# Patient Record
Sex: FEMALE | Race: BLACK OR AFRICAN AMERICAN | NOT HISPANIC OR LATINO | Employment: OTHER | URBAN - METROPOLITAN AREA
[De-identification: names, ages, dates, MRNs, and addresses within clinical notes are randomized per-mention and may not be internally consistent; named-entity substitution may affect disease eponyms.]

---

## 2017-12-06 ENCOUNTER — TELEPHONE (OUTPATIENT)
Dept: FAMILY MEDICINE | Facility: CLINIC | Age: 62
End: 2017-12-06

## 2017-12-06 NOTE — TELEPHONE ENCOUNTER
----- Message from Marilee Harry sent at 12/6/2017  3:02 PM CST -----  Contact: Dulce -Nurse at [facility]  She states that Dr Lu would like for Dr Casas to call him before you leave the office today.  Call him on his cell at [phone].   Patient is a in patient at the hospital.                                           rodriguez

## 2017-12-13 ENCOUNTER — OFFICE VISIT (OUTPATIENT)
Dept: FAMILY MEDICINE | Facility: CLINIC | Age: 62
End: 2017-12-13
Payer: COMMERCIAL

## 2017-12-13 VITALS
BODY MASS INDEX: 55.32 KG/M2 | DIASTOLIC BLOOD PRESSURE: 76 MMHG | TEMPERATURE: 98 F | HEIGHT: 61 IN | SYSTOLIC BLOOD PRESSURE: 122 MMHG | HEART RATE: 103 BPM | RESPIRATION RATE: 18 BRPM | OXYGEN SATURATION: 96 % | WEIGHT: 293 LBS

## 2017-12-13 DIAGNOSIS — I10 ESSENTIAL HYPERTENSION: ICD-10-CM

## 2017-12-13 DIAGNOSIS — I26.99 BILATERAL PULMONARY EMBOLISM: ICD-10-CM

## 2017-12-13 DIAGNOSIS — L03.116 CELLULITIS OF LEFT LOWER EXTREMITY: ICD-10-CM

## 2017-12-13 DIAGNOSIS — Z09 HOSPITAL DISCHARGE FOLLOW-UP: Primary | ICD-10-CM

## 2017-12-13 PROCEDURE — 99214 OFFICE O/P EST MOD 30 MIN: CPT | Mod: 25,S$GLB,, | Performed by: REGISTERED NURSE

## 2017-12-13 PROCEDURE — 99999 PR PBB SHADOW E&M-EST. PATIENT-LVL IV: CPT | Mod: PBBFAC,,, | Performed by: REGISTERED NURSE

## 2017-12-13 PROCEDURE — 96372 THER/PROPH/DIAG INJ SC/IM: CPT | Mod: S$GLB,,, | Performed by: FAMILY MEDICINE

## 2017-12-13 RX ORDER — MULTIVIT WITH MINERALS/HERBS
1 TABLET ORAL DAILY
COMMUNITY

## 2017-12-13 RX ORDER — APIXABAN 5 MG/1
TABLET, FILM COATED ORAL
Refills: 0 | COMMUNITY
Start: 2017-12-06 | End: 2018-01-11 | Stop reason: SDUPTHER

## 2017-12-13 RX ORDER — METOPROLOL TARTRATE 25 MG/1
25 TABLET, FILM COATED ORAL 2 TIMES DAILY
Refills: 3 | COMMUNITY
Start: 2017-11-07

## 2017-12-13 RX ORDER — FUROSEMIDE 40 MG/1
40 TABLET ORAL 2 TIMES DAILY
Refills: 11 | COMMUNITY
Start: 2017-11-07 | End: 2023-04-26

## 2017-12-13 RX ORDER — POTASSIUM CHLORIDE 20 MEQ/1
20 TABLET, EXTENDED RELEASE ORAL DAILY
Refills: 11 | COMMUNITY
Start: 2017-12-06 | End: 2019-07-09 | Stop reason: SDUPTHER

## 2017-12-13 RX ORDER — CEFTRIAXONE 1 G/1
1 INJECTION, POWDER, FOR SOLUTION INTRAMUSCULAR; INTRAVENOUS
Status: COMPLETED | OUTPATIENT
Start: 2017-12-13 | End: 2017-12-13

## 2017-12-13 RX ADMIN — CEFTRIAXONE 1 G: 1 INJECTION, POWDER, FOR SOLUTION INTRAMUSCULAR; INTRAVENOUS at 11:12

## 2017-12-13 NOTE — PROGRESS NOTES
"Subjective:       Patient ID: April Castillo is a 62 y.o. female.    Chief Complaint: Hospital Follow Up      HPI    Mrs. Castillo is here today for hospital follow-up appointment.  She reports getting up to the restroom at home on 12/2/2017 and developed acute SOB and weakness.  Took an ASA at that time.  Subsequently, developed chest pain and flushing.  Called 911 and transported to [facility] for evaluation.  CT of chest showed bilateral PE.  Admitted to hospital for treatment, given Lovenox during stay.  Also treated with antibiotics for LLE cellulitis.  Discharged home on 12/6/2017.  Reports doing much better.  No further episodes of weakness, SOB or chest pain.  She is currently on Eliquis, has completed antibiotics.  Reports LLE leg pain and redness but only slightly improved.  Does have a walker and shower chair for use at home.  Will be f/u with Dr. Luu (Cardiology) on 12/18/2017.       Review of Systems   Constitutional: Negative for chills and fever.   Respiratory: Positive for shortness of breath (resolved). Negative for cough, choking, chest tightness, wheezing and stridor.    Cardiovascular: Positive for chest pain (resolved) and leg swelling (cellulitis). Negative for palpitations.   Neurological: Negative.          Patient Active Problem List   Diagnosis    Spinal stenosis    Gout with manifestations    Chronic pain         Objective:     Vitals:    12/13/17 1030   BP: 122/76   Pulse: 103   Resp: 18   Temp: 97.9 °F (36.6 °C)   TempSrc: Tympanic   SpO2: 96%   Weight: (!) 148 kg (326 lb 4.5 oz)   Height: 5' 1" (1.549 m)   PainSc:   7   PainLoc: Leg       Physical Exam   Constitutional: She is oriented to person, place, and time. She appears well-developed and well-nourished.   Cardiovascular: Normal rate, regular rhythm, normal heart sounds, intact distal pulses and normal pulses.    Pulmonary/Chest: Effort normal and breath sounds normal. No respiratory distress. She has no wheezes. She has no rales. " She exhibits no tenderness.   Neurological: She is alert and oriented to person, place, and time.   Skin: Capillary refill takes less than 2 seconds. Rash (LLE cellulitis) noted.        Psychiatric: She has a normal mood and affect. Her behavior is normal. Judgment and thought content normal.   Vitals reviewed.        Medication List with Changes/Refills   Current Medications    B COMPLEX VITAMINS TABLET    Take 1 tablet by mouth once daily.    CHOLECALCIFEROL, VITAMIN D3, (VITAMIN D3) 5,000 UNIT TAB    Take 5,000 Units by mouth once daily.    DULOXETINE (CYMBALTA) 30 MG CAPSULE    Take by mouth. 1 Capsule, Delayed Release(E.C.) Oral Every day    ELIQUIS 5 MG TAB    TAKE 2 TABLETS BY MOUTH TWICE DAILY UNTIL 12/11 THEN TAKE 1 TABLET TWICE DAILY FOR 6 MONTHS    FOLIC ACID/MULTIVIT-MIN/LUTEIN (CENTRUM SILVER ORAL)    Take by mouth.    FUROSEMIDE (LASIX) 40 MG TABLET    Take 40 mg by mouth 2 (two) times daily.    HYDROCHLOROTHIAZIDE (HYDRODIURIL) 12.5 MG TAB    Take 1 tablet (12.5 mg total) by mouth daily as needed.    HYDROCODONE-ACETAMINOPHEN 10-325MG (NORCO)  MG TAB    Take by mouth. 1 Tablet Oral Four times a day    MELOXICAM (MOBIC) 15 MG TABLET    Take by mouth. 1 Tablet Oral Every day    METOPROLOL TARTRATE (LOPRESSOR) 25 MG TABLET    Take 25 mg by mouth 2 (two) times daily.    OXYMORPHONE (OPANA ER) 20 MG 12 HR TABLET    Take 10 mg by mouth. 1 tablet extended release 12 hr Oral Three times a day    POTASSIUM CHLORIDE SA (K-DUR,KLOR-CON) 20 MEQ TABLET    Take 20 mEq by mouth once daily.    TRAMADOL (ULTRAM) 50 MG TABLET    Take 50 mg by mouth 4 (four) times daily.    TURMERIC-TURMERIC ROOT EXTRACT ORAL    Take by mouth.    ZONISAMIDE (ZONEGRAN) 100 MG CAP    Take by mouth. 4 Capsule Oral Every evening           Diagnosis       1. Hospital discharge follow-up    2. Bilateral pulmonary embolism    3. Cellulitis of left lower extremity    4. Essential hypertension          Assessment/ Plan     Hospital  discharge follow-up        -      Records reviewed.    Bilateral pulmonary embolism        -      Stable, on Eliquis.    Cellulitis of left lower extremity  -     cefTRIAXone injection 1 g; Inject 1 g into the muscle one time.  -     Elevate, local warm compresses.    Essential hypertension        -     Controlled, on medication as ordered.        Hospital records reviewed provided by patient.  Continue home meds as ordered.  Keep appointment with Dr. Luu as scheduled on 12/18/2017.  To see me for recheck of leg in 2 days.  RTC prn.        ZAC Khan  [facility]

## 2017-12-14 ENCOUNTER — PATIENT OUTREACH (OUTPATIENT)
Dept: ADMINISTRATIVE | Facility: HOSPITAL | Age: 62
End: 2017-12-14

## 2018-01-11 RX ORDER — APIXABAN 5 MG/1
TABLET, FILM COATED ORAL
Qty: 30 TABLET | Refills: 0 | Status: SHIPPED | OUTPATIENT
Start: 2018-01-11 | End: 2018-02-08 | Stop reason: SDUPTHER

## 2018-01-11 NOTE — TELEPHONE ENCOUNTER
----- Message from Lilly Camacho sent at 1/11/2018  1:49 PM CST -----  Contact: Pt  Pt called and stated she needed to speak to the nurse. She stated she needs a refill on her medication today.      1. What is the name of the medication you are requesting? Eloquis   2. What is the dose? 5 mg  3. How do you take the medication? Orally, topically, etc? Orally   4. How often do you take this medication? Once a day   5. Do you need a 30 day or 90 day supply? 30 day   6. How many refills are you requesting?   7. What is your preferred pharmacy and location of the pharmacy?   [facility]/pharmacy #[id number] - [place] - [address] AT AT [facility]  [address]  [place] [address]  Phone: [phone] Fax: [phone]      8. Who can we contact with further questions? She can be reached at [phone].  Thanks,  TF

## 2018-02-08 RX ORDER — APIXABAN 5 MG/1
TABLET, FILM COATED ORAL
Qty: 60 TABLET | Refills: 0 | Status: SHIPPED | OUTPATIENT
Start: 2018-02-08 | End: 2018-03-13 | Stop reason: SDUPTHER

## 2018-02-08 NOTE — TELEPHONE ENCOUNTER
----- Message from Robbie Wells sent at 2/8/2018  2:13 PM CST -----  Contact: Pt  Please give pt a call at [phone] regarding a error in her medication

## 2018-03-09 NOTE — TELEPHONE ENCOUNTER
----- Message from Michelle Braswell sent at 3/9/2018 11:48 AM CST -----  Contact: self [phone]  States that she is calling to check status on refill request for eliquis. Please call back at [phone]//thank you acc

## 2018-03-12 RX ORDER — APIXABAN 5 MG/1
TABLET, FILM COATED ORAL
Qty: 60 TABLET | Refills: 0 | Status: CANCELLED | OUTPATIENT
Start: 2018-03-12

## 2018-03-12 NOTE — TELEPHONE ENCOUNTER
Please advise pt as she follows with Dr. Luu (Cardiology) for PE/blood clot, I will not be able to honor refill for her; she needs to get if from him.  How long has she said she needs to be on it?

## 2018-03-12 NOTE — TELEPHONE ENCOUNTER
Pt states sakshi has been filling. She is going to take her last pill tonight and will need one in the morning. Wants to know if you can fill in her place.

## 2018-03-12 NOTE — TELEPHONE ENCOUNTER
----- Message from Vonnie Mosley sent at 3/12/2018  3:05 PM CDT -----  Contact: pt  The pt request a call concerning a med refill, the pt can be reached at [phone]///thxMW

## 2018-03-13 RX ORDER — APIXABAN 5 MG/1
TABLET, FILM COATED ORAL
Qty: 60 TABLET | Refills: 0 | Status: SHIPPED | OUTPATIENT
Start: 2018-03-13

## 2018-03-13 NOTE — TELEPHONE ENCOUNTER
This is her last refill from me --- needs to be discussing and getting from her Cardiologist///adc

## 2018-03-13 NOTE — TELEPHONE ENCOUNTER
----- Message from Abhinav Ma sent at 3/13/2018  8:41 AM CDT -----  Contact: pt  1. What is the name of the medication you are requesting? Elquis  2. What is the dose? 5 mg  3. How do you take the medication? Orally, topically, etc? Orally  4. How often do you take this medication? Twice daily  5. Do you need a 30 day or 90 day supply? 90  6. How many refills are you requesting? 4  7. What is your preferred pharmacy and location of the pharmacy? [facility] on [address] ph# [phone]  8. Who can we contact with further questions? [phone] (cell)    Pt states she is out of her medication and pls, give her a call when Rx has been sent to the pharmacy....

## 2018-03-13 NOTE — TELEPHONE ENCOUNTER
Pt notified of medication sent to the pharmacy and to get further refills from cardiologist. Pt voiced understanding.

## 2018-06-15 DIAGNOSIS — Z12.39 BREAST CANCER SCREENING: ICD-10-CM

## 2018-08-24 ENCOUNTER — OFFICE VISIT (OUTPATIENT)
Dept: FAMILY MEDICINE | Facility: CLINIC | Age: 63
End: 2018-08-24
Payer: COMMERCIAL

## 2018-08-24 VITALS
WEIGHT: 293 LBS | HEART RATE: 76 BPM | BODY MASS INDEX: 55.32 KG/M2 | SYSTOLIC BLOOD PRESSURE: 134 MMHG | OXYGEN SATURATION: 95 % | TEMPERATURE: 98 F | HEIGHT: 61 IN | DIASTOLIC BLOOD PRESSURE: 88 MMHG

## 2018-08-24 DIAGNOSIS — R60.0 BILATERAL LOWER EXTREMITY EDEMA: ICD-10-CM

## 2018-08-24 DIAGNOSIS — L03.90 CELLULITIS, UNSPECIFIED CELLULITIS SITE: Primary | ICD-10-CM

## 2018-08-24 DIAGNOSIS — G89.4 CHRONIC PAIN SYNDROME: ICD-10-CM

## 2018-08-24 PROCEDURE — 3008F BODY MASS INDEX DOCD: CPT | Mod: CPTII,S$GLB,, | Performed by: REGISTERED NURSE

## 2018-08-24 PROCEDURE — 99214 OFFICE O/P EST MOD 30 MIN: CPT | Mod: S$GLB,,, | Performed by: REGISTERED NURSE

## 2018-08-24 PROCEDURE — 99999 PR PBB SHADOW E&M-EST. PATIENT-LVL IV: CPT | Mod: PBBFAC,,, | Performed by: REGISTERED NURSE

## 2018-08-24 PROCEDURE — 3079F DIAST BP 80-89 MM HG: CPT | Mod: CPTII,S$GLB,, | Performed by: REGISTERED NURSE

## 2018-08-24 PROCEDURE — 3075F SYST BP GE 130 - 139MM HG: CPT | Mod: CPTII,S$GLB,, | Performed by: REGISTERED NURSE

## 2018-08-24 RX ORDER — CLINDAMYCIN HYDROCHLORIDE 300 MG/1
300 CAPSULE ORAL 3 TIMES DAILY
Qty: 30 CAPSULE | Refills: 0 | Status: SHIPPED | OUTPATIENT
Start: 2018-08-24 | End: 2018-09-03

## 2018-08-30 NOTE — PROGRESS NOTES
"Subjective:       Patient ID: April Castillo is a 63 y.o. female.    Chief Complaint: Leg Pain       HPI    Mrs. Castillo is here today with c/o LT lower leg pain x 1 week.  Denies injury or trauma.  Does have a Morphine pain pump inserted, followed by Dr. Rader.  She did notify Dr. Rader and Jus (her pain MD) about the leg pain, US of leg done and was negative for clots.  Vitamin levels were checked, okay.  Feels the pain "deep to my bone".  Uses walker to get around.  Currently on Norco 10 mg as ordered per Dr. Luu.      Review of Systems   Constitutional: Negative.    Respiratory: Negative.    Cardiovascular: Positive for leg swelling. Negative for chest pain and palpitations.   Musculoskeletal: Positive for gait problem, joint swelling and myalgias (LT lower leg pain).   Skin: Positive for color change (lower legs discolored).   Neurological: Negative for weakness, light-headedness, numbness and headaches.         Patient Active Problem List   Diagnosis    Spinal stenosis    Gout with manifestations    Chronic pain    Bilateral pulmonary embolism    Essential hypertension       Past medical, surgical, family and social histories have been reviewed today.        Objective:     Vitals:    08/24/18 1144   BP: 134/88   Pulse: 76   Temp: 97.5 °F (36.4 °C)   TempSrc: Oral   SpO2: 95%   Weight: (!) 147.8 kg (325 lb 13.4 oz)   Height: 5' 1" (1.549 m)   PainSc: 10-Worst pain ever   PainLoc: Leg       Physical Exam   Constitutional: She is oriented to person, place, and time. She appears well-developed and well-nourished.   Cardiovascular: Normal rate and regular rhythm.   Pulmonary/Chest: Effort normal and breath sounds normal.   Musculoskeletal: Normal range of motion. She exhibits edema (1+ pitting BLE with early signs of cellulitis to LLE from ankle to tib-fib area) and tenderness (lower leg). She exhibits no deformity.   Neurological: She is alert and oriented to person, place, and time.   Psychiatric: She has " a normal mood and affect. Her behavior is normal. Judgment and thought content normal.   Vitals reviewed.        Medication List with Changes/Refills   New Medications    CLINDAMYCIN (CLEOCIN) 300 MG CAPSULE    Take 1 capsule (300 mg total) by mouth 3 (three) times daily. for 10 days   Current Medications    B COMPLEX VITAMINS TABLET    Take 1 tablet by mouth once daily.    CHOLECALCIFEROL, VITAMIN D3, (VITAMIN D3) 5,000 UNIT TAB    Take 5,000 Units by mouth once daily.    DULOXETINE (CYMBALTA) 30 MG CAPSULE    Take by mouth. 1 Capsule, Delayed Release(E.C.) Oral Every day    ELIQUIS 5 MG TAB    TAKE 2 TABLETS BY MOUTH TWICE DAILY UNTIL 12/11 THEN TAKE 1 TABLET TWICE DAILY FOR 6 MONTHS    FOLIC ACID/MULTIVIT-MIN/LUTEIN (CENTRUM SILVER ORAL)    Take by mouth.    FUROSEMIDE (LASIX) 40 MG TABLET    Take 40 mg by mouth 2 (two) times daily.    HYDROCODONE-ACETAMINOPHEN 10-325MG (NORCO)  MG TAB    Take by mouth. 1 Tablet Oral Four times a day    MELOXICAM (MOBIC) 15 MG TABLET    Take by mouth. 1 Tablet Oral Every day    METOPROLOL TARTRATE (LOPRESSOR) 25 MG TABLET    Take 25 mg by mouth 2 (two) times daily.    POTASSIUM CHLORIDE SA (K-DUR,KLOR-CON) 20 MEQ TABLET    Take 20 mEq by mouth once daily.    TRAMADOL (ULTRAM) 50 MG TABLET    Take 50 mg by mouth 4 (four) times daily.    TURMERIC-TURMERIC ROOT EXTRACT ORAL    Take by mouth.    ZONISAMIDE (ZONEGRAN) 100 MG CAP    Take by mouth. 4 Capsule Oral Every evening   Discontinued Medications    HYDROCHLOROTHIAZIDE (HYDRODIURIL) 12.5 MG TAB    Take 1 tablet (12.5 mg total) by mouth daily as needed.    OXYMORPHONE (OPANA ER) 20 MG 12 HR TABLET    Take 10 mg by mouth. 1 tablet extended release 12 hr Oral Three times a day           Diagnosis       1. Cellulitis, unspecified cellulitis site    2. Bilateral lower extremity edema    3. Chronic pain syndrome          Assessment/ Plan     Cellulitis, unspecified cellulitis site  · Problem not controlled at this  time.  · Medication discussed, take as directed.  · Leg care discussed.  · Orders:  -     clindamycin (CLEOCIN) 300 MG capsule; Take 1 capsule (300 mg total) by mouth 3 (three) times daily. for 10 days  Dispense: 30 capsule; Refill: 0    Bilateral lower extremity edema  · Low-salt diet, elevate legs.  · Continue Lasix as ordered.    Chronic pain syndrome  · Followed by Celeste Luu and Prasanth, pain pump in place.        Follow-up in clinic in 2 to 3 days if condition worsens or fails to improve.  RTC prn.        ZAC Khan  [facility]

## 2018-09-04 ENCOUNTER — TELEPHONE (OUTPATIENT)
Dept: FAMILY MEDICINE | Facility: CLINIC | Age: 63
End: 2018-09-04

## 2018-09-04 NOTE — TELEPHONE ENCOUNTER
Pt states that the antibiotics worked and that her legs are still hurting but not as bad. She would like more antibiotics called in.

## 2018-09-04 NOTE — TELEPHONE ENCOUNTER
----- Message from Danya Sharp sent at 9/4/2018 11:24 AM CDT -----  Contact: Patient  Patient called to speak with the nurse; she stated she was put on antibiotics last week and she wanted to give an update on her condition. She can be contacted at [phone].    Thanks,  Danya

## 2018-11-06 ENCOUNTER — OFFICE VISIT (OUTPATIENT)
Dept: FAMILY MEDICINE | Facility: CLINIC | Age: 63
End: 2018-11-06
Payer: COMMERCIAL

## 2018-11-06 ENCOUNTER — TELEPHONE (OUTPATIENT)
Dept: FAMILY MEDICINE | Facility: CLINIC | Age: 63
End: 2018-11-06

## 2018-11-06 VITALS
SYSTOLIC BLOOD PRESSURE: 110 MMHG | HEIGHT: 61 IN | HEART RATE: 86 BPM | DIASTOLIC BLOOD PRESSURE: 64 MMHG | WEIGHT: 293 LBS | RESPIRATION RATE: 22 BRPM | OXYGEN SATURATION: 96 % | BODY MASS INDEX: 55.32 KG/M2

## 2018-11-06 DIAGNOSIS — R32 URINARY INCONTINENCE, UNSPECIFIED TYPE: ICD-10-CM

## 2018-11-06 DIAGNOSIS — I10 ESSENTIAL HYPERTENSION: ICD-10-CM

## 2018-11-06 DIAGNOSIS — L03.115 CELLULITIS OF RIGHT LOWER LEG: Primary | ICD-10-CM

## 2018-11-06 LAB
BILIRUB UR QL STRIP: NEGATIVE
CLARITY UR REFRACT.AUTO: CLEAR
COLOR UR AUTO: YELLOW
GLUCOSE UR QL STRIP: NEGATIVE
HGB UR QL STRIP: ABNORMAL
KETONES UR QL STRIP: NEGATIVE
LEUKOCYTE ESTERASE UR QL STRIP: NEGATIVE
MICROSCOPIC COMMENT: ABNORMAL
NITRITE UR QL STRIP: NEGATIVE
PH UR STRIP: 6 [PH] (ref 5–8)
PROT UR QL STRIP: NEGATIVE
RBC #/AREA URNS AUTO: 43 /HPF (ref 0–4)
SP GR UR STRIP: 1.01 (ref 1–1.03)
SQUAMOUS #/AREA URNS AUTO: 1 /HPF
URN SPEC COLLECT METH UR: ABNORMAL
WBC #/AREA URNS AUTO: 2 /HPF (ref 0–5)

## 2018-11-06 PROCEDURE — 81001 URINALYSIS AUTO W/SCOPE: CPT

## 2018-11-06 PROCEDURE — 99214 OFFICE O/P EST MOD 30 MIN: CPT | Mod: S$GLB,,, | Performed by: REGISTERED NURSE

## 2018-11-06 PROCEDURE — 87086 URINE CULTURE/COLONY COUNT: CPT

## 2018-11-06 PROCEDURE — 3078F DIAST BP <80 MM HG: CPT | Mod: CPTII,S$GLB,, | Performed by: REGISTERED NURSE

## 2018-11-06 PROCEDURE — 3074F SYST BP LT 130 MM HG: CPT | Mod: CPTII,S$GLB,, | Performed by: REGISTERED NURSE

## 2018-11-06 PROCEDURE — 99999 PR PBB SHADOW E&M-EST. PATIENT-LVL V: CPT | Mod: PBBFAC,,, | Performed by: REGISTERED NURSE

## 2018-11-06 PROCEDURE — 3008F BODY MASS INDEX DOCD: CPT | Mod: CPTII,S$GLB,, | Performed by: REGISTERED NURSE

## 2018-11-06 RX ORDER — DULOXETIN HYDROCHLORIDE 60 MG/1
60 CAPSULE, DELAYED RELEASE ORAL DAILY
Refills: 11 | COMMUNITY
Start: 2018-10-30 | End: 2022-03-03 | Stop reason: SDUPTHER

## 2018-11-06 RX ORDER — SULFAMETHOXAZOLE AND TRIMETHOPRIM 800; 160 MG/1; MG/1
1 TABLET ORAL 2 TIMES DAILY
Qty: 20 TABLET | Refills: 0 | Status: SHIPPED | OUTPATIENT
Start: 2018-11-06 | End: 2018-11-16

## 2018-11-06 NOTE — TELEPHONE ENCOUNTER
----- Message from Yokasta Marrero sent at 11/6/2018  3:49 PM CST -----  Contact: self  Requesting a call back regarding not receiving after visit summary.also she would like to know is there any type of creme she will need to put on her legs.you can mail paperwork.please call back at [phone].      Yokasta Marrero

## 2018-11-06 NOTE — PROGRESS NOTES
"Subjective:       Patient ID: April Castillo is a 63 y.o. female.    Chief Complaint: Leg Swelling      HPI    April Castillo is here today with c/o leg swelling since Sunday 11/4/2018.  Since that time, she has had significant redness and swelling to the right lower leg with tenderness & pain.  Denies drainage from skin.  Has not been elevating leg to help with swelling.  Reports "wetting myself" and leaking, not able to make it to the toilet in time.  Denies fever, chills, painful urination, or hematuria.  Followed for chronic back issues with Celeste Ortiz and Prasanth.      Review of Systems   Constitutional: Negative for chills and fever.   Respiratory: Negative.    Cardiovascular: Positive for leg swelling. Negative for chest pain and palpitations.   Genitourinary: Positive for enuresis and frequency. Negative for flank pain, hematuria, pelvic pain and urgency.   Musculoskeletal: Positive for back pain.   Skin: Positive for color change.   Neurological: Negative.        Review of patient's allergies indicates:  No Active Allergies    Patient Active Problem List   Diagnosis    Spinal stenosis    Gout with manifestations    Chronic pain    Bilateral pulmonary embolism    Essential hypertension       Current Outpatient Medications on File Prior to Visit   Medication Sig Dispense Refill    b complex vitamins tablet Take 1 tablet by mouth once daily.      cholecalciferol, vitamin D3, (VITAMIN D3) 5,000 unit Tab Take 5,000 Units by mouth once daily.      duloxetine (CYMBALTA) 30 MG capsule Take by mouth. 1 Capsule, Delayed Release(E.C.) Oral Every day      DULoxetine (CYMBALTA) 60 MG capsule Take 60 mg by mouth once daily.  11    ELIQUIS 5 mg Tab TAKE 2 TABLETS BY MOUTH TWICE DAILY UNTIL 12/11 THEN TAKE 1 TABLET TWICE DAILY FOR 6 MONTHS 60 tablet 0    FOLIC ACID/MULTIVIT-MIN/LUTEIN (CENTRUM SILVER ORAL) Take by mouth.      furosemide (LASIX) 40 MG tablet Take 40 mg by mouth 2 (two) times daily.  11    " "hydrocodone-acetaminophen 10-325mg (NORCO)  mg Tab Take by mouth. 1 Tablet Oral Four times a day      metoprolol tartrate (LOPRESSOR) 25 MG tablet Take 25 mg by mouth 2 (two) times daily.  3    potassium chloride SA (K-DUR,KLOR-CON) 20 MEQ tablet Take 20 mEq by mouth once daily.  11    tramadol (ULTRAM) 50 mg tablet Take 50 mg by mouth 4 (four) times daily.  2    TURMERIC-TURMERIC ROOT EXTRACT ORAL Take by mouth.      zonisamide (ZONEGRAN) 100 MG Cap Take by mouth. 4 Capsule Oral Every evening      meloxicam (MOBIC) 15 MG tablet Take by mouth. 1 Tablet Oral Every day           Past medical, surgical, family and social histories have been reviewed today.        Objective:     Vitals:    11/06/18 1158   BP: 110/64   Pulse: 86   Resp: (!) 22   SpO2: 96%   Weight: (!) 147.7 kg (325 lb 9.9 oz)   Height: 5' 1" (1.549 m)   PainSc: 10-Worst pain ever   PainLoc: Back         Physical Exam   Constitutional: She is oriented to person, place, and time. She appears well-developed and well-nourished. No distress.   Cardiovascular: Normal rate, regular rhythm and normal heart sounds. Exam reveals no gallop and no friction rub.   No murmur heard.  Pulmonary/Chest: Effort normal and breath sounds normal.   Musculoskeletal: Normal range of motion. She exhibits edema (2+ pitting to RLE with cellulitis over area from foot/ankle extending to pretibial area) and tenderness (RLE).   Neurological: She is alert and oriented to person, place, and time.   Skin: She is not diaphoretic.   Psychiatric: She has a normal mood and affect. Her behavior is normal. Judgment and thought content normal.         Diagnosis       1. Cellulitis of right lower leg    2. Essential hypertension    3. Urinary incontinence, unspecified type          Assessment/ Plan     Cellulitis of right lower leg  · New problem reported and identified, txmt plan and medication discussed.  · Elevate legs, pain med as needed (has at home).  · Orders:  -     " sulfamethoxazole-trimethoprim 800-160mg (BACTRIM DS) 800-160 mg Tab; Take 1 tablet by mouth 2 (two) times daily. for 10 days  Dispense: 20 tablet; Refill: 0    Essential hypertension  · Stable and controlled, on medication as ordered to continue.    Urinary incontinence, unspecified type  · New problem reported and identified, check lab today to rule out infection.  · May consider OAB medication if lab normal.  · Orders:  -     Urinalysis  -     Urine culture      Discuss any back issues with treating specialists.  Further treatment plan pending UA results.  Follow-up in clinic for recheck on Friday 11/9/2018.  RTC prn.        ZAC Khan  [facility]

## 2018-11-07 LAB — BACTERIA UR CULT: NORMAL

## 2018-11-08 ENCOUNTER — TELEPHONE (OUTPATIENT)
Dept: FAMILY MEDICINE | Facility: CLINIC | Age: 63
End: 2018-11-08

## 2018-11-08 NOTE — TELEPHONE ENCOUNTER
----- Message from Yahaira Gonzalez sent at 11/8/2018  8:16 AM CST -----  Contact: self/[phone]  Returning call, please call back at [phone]. Thanks/ar

## 2018-11-08 NOTE — TELEPHONE ENCOUNTER
Pt c/o leg pain and states that she has only taken medicine for 2 days. Wanted to confirm appointment on tmw 11/9/2018. Verbalized understanding.//ky

## 2018-11-12 ENCOUNTER — TELEPHONE (OUTPATIENT)
Dept: FAMILY MEDICINE | Facility: CLINIC | Age: 63
End: 2018-11-12

## 2018-11-12 DIAGNOSIS — R31.9 HEMATURIA, UNSPECIFIED TYPE: Primary | ICD-10-CM

## 2018-11-12 NOTE — TELEPHONE ENCOUNTER
Urine culture negative but shows presence of blood.  This is not a new issue for her, blood showed up about 2 years ago in specimen.  She needs to make sure drinking plenty of water daily.  Recheck urine in 2 weeks.  Lab orders in.

## 2018-11-13 NOTE — TELEPHONE ENCOUNTER
----- Message from Chris Eubanks sent at 11/13/2018  3:15 PM CST -----  Contact: Pt.   Pt is calling regarding requesting to have nurse call Pt. Pt would like nurse to know that Pt as been in hospital. .[phone] Phone

## 2018-11-25 DIAGNOSIS — L03.115 CELLULITIS OF RIGHT LOWER LEG: ICD-10-CM

## 2018-11-25 RX ORDER — SULFAMETHOXAZOLE AND TRIMETHOPRIM 800; 160 MG/1; MG/1
1 TABLET ORAL 2 TIMES DAILY
Qty: 20 TABLET | Refills: 0 | OUTPATIENT
Start: 2018-11-25 | End: 2018-12-05

## 2018-11-26 ENCOUNTER — TELEPHONE (OUTPATIENT)
Dept: FAMILY MEDICINE | Facility: CLINIC | Age: 63
End: 2018-11-26

## 2018-11-26 NOTE — TELEPHONE ENCOUNTER
----- Message from Julee Talib sent at 11/26/2018  4:51 PM CST -----  Contact: Patient   Patient is calling to check up on the status of her refill request for antibiotics. Please call her at [phone].    [facility]/pharmacy #[id number] - [place] - [address] AT AT [facility]  [address] [place] [address]  Phone: [phone] Fax: [phone]    Thanks  Td

## 2018-11-26 NOTE — TELEPHONE ENCOUNTER
----- Message from Emma Sims LPN sent at 11/26/2018  2:24 PM CST -----  Contact: Pt      ----- Message -----  From: Christina Rhoades  Sent: 11/26/2018  12:49 PM  To: Iván JONES Staff    Pt states she does not have transportation. Pt states she needs antibiotics. Pt also states she needs assistance with transportation.Please contact pt on [phone]

## 2018-11-28 ENCOUNTER — TELEPHONE (OUTPATIENT)
Dept: FAMILY MEDICINE | Facility: CLINIC | Age: 63
End: 2018-11-28

## 2018-11-28 ENCOUNTER — OFFICE VISIT (OUTPATIENT)
Dept: FAMILY MEDICINE | Facility: CLINIC | Age: 63
End: 2018-11-28
Payer: COMMERCIAL

## 2018-11-28 VITALS
HEIGHT: 61 IN | WEIGHT: 293 LBS | HEART RATE: 106 BPM | SYSTOLIC BLOOD PRESSURE: 120 MMHG | OXYGEN SATURATION: 96 % | DIASTOLIC BLOOD PRESSURE: 68 MMHG | TEMPERATURE: 99 F | BODY MASS INDEX: 55.32 KG/M2

## 2018-11-28 DIAGNOSIS — I10 ESSENTIAL HYPERTENSION: ICD-10-CM

## 2018-11-28 DIAGNOSIS — L03.115 CELLULITIS OF RIGHT LOWER LEG: ICD-10-CM

## 2018-11-28 DIAGNOSIS — Z09 HOSPITAL DISCHARGE FOLLOW-UP: Primary | ICD-10-CM

## 2018-11-28 DIAGNOSIS — G89.4 CHRONIC PAIN SYNDROME: ICD-10-CM

## 2018-11-28 PROCEDURE — 99214 OFFICE O/P EST MOD 30 MIN: CPT | Mod: 25,S$GLB,, | Performed by: REGISTERED NURSE

## 2018-11-28 PROCEDURE — 3078F DIAST BP <80 MM HG: CPT | Mod: CPTII,S$GLB,, | Performed by: REGISTERED NURSE

## 2018-11-28 PROCEDURE — 3074F SYST BP LT 130 MM HG: CPT | Mod: CPTII,S$GLB,, | Performed by: REGISTERED NURSE

## 2018-11-28 PROCEDURE — 96372 THER/PROPH/DIAG INJ SC/IM: CPT | Mod: S$GLB,,, | Performed by: REGISTERED NURSE

## 2018-11-28 PROCEDURE — 3008F BODY MASS INDEX DOCD: CPT | Mod: CPTII,S$GLB,, | Performed by: REGISTERED NURSE

## 2018-11-28 PROCEDURE — 99999 PR PBB SHADOW E&M-EST. PATIENT-LVL IV: CPT | Mod: PBBFAC,,, | Performed by: REGISTERED NURSE

## 2018-11-28 RX ORDER — SULFAMETHOXAZOLE AND TRIMETHOPRIM 800; 160 MG/1; MG/1
1 TABLET ORAL 2 TIMES DAILY
Qty: 20 TABLET | Refills: 0 | Status: SHIPPED | OUTPATIENT
Start: 2018-11-28 | End: 2018-12-08

## 2018-11-28 RX ORDER — MEPERIDINE HYDROCHLORIDE 50 MG/ML
75 INJECTION INTRAMUSCULAR; INTRAVENOUS; SUBCUTANEOUS ONCE
Status: COMPLETED | OUTPATIENT
Start: 2018-11-28 | End: 2018-11-28

## 2018-11-28 RX ORDER — PROMETHAZINE HYDROCHLORIDE 50 MG/ML
50 INJECTION, SOLUTION INTRAMUSCULAR; INTRAVENOUS
Status: COMPLETED | OUTPATIENT
Start: 2018-11-28 | End: 2018-11-28

## 2018-11-28 RX ADMIN — MEPERIDINE HYDROCHLORIDE 75 MG: 50 INJECTION INTRAMUSCULAR; INTRAVENOUS; SUBCUTANEOUS at 04:11

## 2018-11-28 RX ADMIN — PROMETHAZINE HYDROCHLORIDE 50 MG: 50 INJECTION, SOLUTION INTRAMUSCULAR; INTRAVENOUS at 04:11

## 2018-12-05 NOTE — PROGRESS NOTES
Subjective:       Patient ID: April Castillo is a 63 y.o. female.    Chief Complaint: Hospital Follow Up      HPI    April Castillo is here today for hospital follow-up.  Seen at  General ED on 11/9/2015 for leg cellulitis, pain and drainage.  Admitted for IV antibiotics, stabilized and sent home on 11/15/2018.  She has completed 7 days of Bactrim-DS, failed on outpatient clindamycin.  [facility] ([facility]) visiting for wound care and dressing changes twice a week and for PT.  Her main complaint today is increased leg pain.  Leg still red but leaking has stopped.  Denies fever or chills.  She has been taking tramadol and Norco as ordered per Pain Mgmt but not helping leg pain.  Also with pain pump in place with Morphine and Baclofen.      Review of Systems   Constitutional: Negative for chills and fever.   Respiratory: Negative.    Cardiovascular: Negative.    Musculoskeletal: Positive for gait problem and joint swelling (pain to swelling to RLE).   Skin: Positive for color change (cellulitis RLE).   Neurological: Negative for weakness, light-headedness and headaches.       Review of patient's allergies indicates:  No Known Allergies    Patient Active Problem List   Diagnosis    Spinal stenosis    Gout with manifestations    Chronic pain    Bilateral pulmonary embolism    Essential hypertension       Current Outpatient Medications on File Prior to Visit   Medication Sig Dispense Refill    b complex vitamins tablet Take 1 tablet by mouth once daily.      cholecalciferol, vitamin D3, (VITAMIN D3) 5,000 unit Tab Take 5,000 Units by mouth once daily.      duloxetine (CYMBALTA) 30 MG capsule Take by mouth. 1 Capsule, Delayed Release(E.C.) Oral Every day      DULoxetine (CYMBALTA) 60 MG capsule Take 60 mg by mouth once daily.  11    ELIQUIS 5 mg Tab TAKE 2 TABLETS BY MOUTH TWICE DAILY UNTIL 12/11 THEN TAKE 1 TABLET TWICE DAILY FOR 6 MONTHS 60 tablet 0    FOLIC ACID/MULTIVIT-MIN/LUTEIN (CENTRUM SILVER ORAL) Take  "by mouth.      furosemide (LASIX) 40 MG tablet Take 40 mg by mouth 2 (two) times daily.  11    hydrocodone-acetaminophen 10-325mg (NORCO)  mg Tab Take by mouth. 1 Tablet Oral Four times a day      meloxicam (MOBIC) 15 MG tablet Take by mouth. 1 Tablet Oral Every day      metoprolol tartrate (LOPRESSOR) 25 MG tablet Take 25 mg by mouth 2 (two) times daily.  3    potassium chloride SA (K-DUR,KLOR-CON) 20 MEQ tablet Take 20 mEq by mouth once daily.  11    tramadol (ULTRAM) 50 mg tablet Take 50 mg by mouth 4 (four) times daily.  2    TURMERIC-TURMERIC ROOT EXTRACT ORAL Take by mouth.      zonisamide (ZONEGRAN) 100 MG Cap Take by mouth. 4 Capsule Oral Every evening       No current facility-administered medications on file prior to visit.        Past medical, surgical, family and social histories have been reviewed today.        Objective:     Vitals:    11/28/18 1543   BP: 120/68   Pulse: 106   Temp: 98.6 °F (37 °C)   TempSrc: Oral   SpO2: 96%   Weight: (!) 140.8 kg (310 lb 6.5 oz)   Height: 5' 1" (1.549 m)   PainSc: 10-Worst pain ever   PainLoc: Leg         Physical Exam   Constitutional: She is oriented to person, place, and time. She appears well-developed and well-nourished.   Cardiovascular: Regular rhythm and normal heart sounds. Tachycardia present.   Pulses:       Dorsalis pedis pulses are 1+ on the right side, and 1+ on the left side.   Pulmonary/Chest: Effort normal and breath sounds normal.   Neurological: She is alert and oriented to person, place, and time.   Skin: Skin is warm and dry.        RLE wrapped & bandaged.  Declines to take off and have full exam today.  LLE exam normal.   Vitals reviewed.        Diagnosis       1. Hospital discharge follow-up    2. Cellulitis of right lower leg    3. Essential hypertension    4. Chronic pain syndrome          Assessment/ Plan     Hospital discharge follow-up  · Hospital records to be requested.    Cellulitis of right lower leg  · Infection to the " RLE, improving some with wound care per Home Health.  · She requests refill on pain medication ----  reviewed ---- tramadol last refilled 11/5/2018, Norco 11/8/2018 both for 30 day supply.  Explained to her that no refills will be given as she is under care of Pain Mgmt and refill too soon.  Advised her to discuss with her Pain specialist.  Injections given today to help with her pain level.  · Antibiotic refilled.  -     sulfamethoxazole-trimethoprim 800-160mg (BACTRIM DS) 800-160 mg Tab; Take 1 tablet by mouth 2 (two) times daily. for 10 days  Dispense: 20 tablet; Refill: 0  -     meperidine injection 75 mg  -     promethazine injection 50 mg    Essential hypertension  · Stable and well-controlled, on medication to continue.    Chronic pain syndrome  · Followed by Celeste Rader and Diana, on Norco, tramadol and pain pump.          Follow-up in 1 week for recheck, or sooner if needed.  RTC prn.        ZAC Khan  [facility]

## 2018-12-12 ENCOUNTER — OFFICE VISIT (OUTPATIENT)
Dept: FAMILY MEDICINE | Facility: CLINIC | Age: 63
End: 2018-12-12
Payer: COMMERCIAL

## 2018-12-12 VITALS
TEMPERATURE: 98 F | DIASTOLIC BLOOD PRESSURE: 78 MMHG | WEIGHT: 293 LBS | OXYGEN SATURATION: 95 % | RESPIRATION RATE: 18 BRPM | BODY MASS INDEX: 55.32 KG/M2 | SYSTOLIC BLOOD PRESSURE: 120 MMHG | HEART RATE: 106 BPM | HEIGHT: 61 IN

## 2018-12-12 DIAGNOSIS — L03.115 CELLULITIS OF RIGHT LOWER EXTREMITY: Primary | ICD-10-CM

## 2018-12-12 DIAGNOSIS — I10 ESSENTIAL HYPERTENSION: ICD-10-CM

## 2018-12-12 DIAGNOSIS — I87.8 VENOUS STASIS: ICD-10-CM

## 2018-12-12 PROCEDURE — 3008F BODY MASS INDEX DOCD: CPT | Mod: CPTII,S$GLB,, | Performed by: FAMILY MEDICINE

## 2018-12-12 PROCEDURE — 3074F SYST BP LT 130 MM HG: CPT | Mod: CPTII,S$GLB,, | Performed by: FAMILY MEDICINE

## 2018-12-12 PROCEDURE — 99214 OFFICE O/P EST MOD 30 MIN: CPT | Mod: S$GLB,,, | Performed by: FAMILY MEDICINE

## 2018-12-12 PROCEDURE — 3078F DIAST BP <80 MM HG: CPT | Mod: CPTII,S$GLB,, | Performed by: FAMILY MEDICINE

## 2018-12-12 PROCEDURE — 99999 PR PBB SHADOW E&M-EST. PATIENT-LVL IV: CPT | Mod: PBBFAC,,, | Performed by: FAMILY MEDICINE

## 2018-12-12 RX ORDER — DOXYCYCLINE 100 MG/1
100 CAPSULE ORAL 2 TIMES DAILY
Qty: 20 CAPSULE | Refills: 0 | Status: SHIPPED | OUTPATIENT
Start: 2018-12-12 | End: 2018-12-22

## 2018-12-13 NOTE — PROGRESS NOTES
"Subjective:      Patient ID: April Castillo is a 63 y.o. female.    Chief Complaint: Follow-up      Past Medical History:   Diagnosis Date    Arthritis     Chronic pain     Gout with manifestations     History of abnormal cervical Pap smear     Repeat pap smear okay    Morbid obesity with BMI of 60.0-69.9, adult     Spinal stenosis      Past Surgical History:   Procedure Laterality Date    BACK SURGERY      bilateral tubal ligation       SECTION      Pain pump insertion (morphine)       Family History   Problem Relation Age of Onset    Hypertension Mother     Diabetes Father     Diabetes Sister     Multiple sclerosis Sister     Diabetes Mellitus Sister         Borderline    No Known Problems Daughter     Cancer Maternal Aunt         "blood cancer"    Stroke Neg Hx     Heart disease Neg Hx      Social History     Socioeconomic History    Marital status: Single     Spouse name: Not on file    Number of children: 1    Years of education: Not on file    Highest education level: Not on file   Social Needs    Financial resource strain: Not on file    Food insecurity - worry: Not on file    Food insecurity - inability: Not on file    Transportation needs - medical: Not on file    Transportation needs - non-medical: Not on file   Occupational History    Occupation: Retired   Tobacco Use    Smoking status: Never Smoker    Smokeless tobacco: Never Used   Substance and Sexual Activity    Alcohol use: No    Drug use: No    Sexual activity: No     Partners: Male     Birth control/protection: Surgical   Other Topics Concern    Not on file   Social History Narrative    She wears seatbelt.       Current Outpatient Medications:     b complex vitamins tablet, Take 1 tablet by mouth once daily., Disp: , Rfl:     cholecalciferol, vitamin D3, (VITAMIN D3) 5,000 unit Tab, Take 5,000 Units by mouth once daily., Disp: , Rfl:     DULoxetine (CYMBALTA) 60 MG capsule, Take 60 mg by mouth once daily., " Disp: , Rfl: 11    ELIQUIS 5 mg Tab, TAKE 2 TABLETS BY MOUTH TWICE DAILY UNTIL 12/11 THEN TAKE 1 TABLET TWICE DAILY FOR 6 MONTHS, Disp: 60 tablet, Rfl: 0    FOLIC ACID/MULTIVIT-MIN/LUTEIN (CENTRUM SILVER ORAL), Take by mouth., Disp: , Rfl:     furosemide (LASIX) 40 MG tablet, Take 40 mg by mouth 2 (two) times daily., Disp: , Rfl: 11    hydrocodone-acetaminophen 10-325mg (NORCO)  mg Tab, Take by mouth. 1 Tablet Oral Four times a day, Disp: , Rfl:     metoprolol tartrate (LOPRESSOR) 25 MG tablet, Take 25 mg by mouth 2 (two) times daily., Disp: , Rfl: 3    potassium chloride SA (K-DUR,KLOR-CON) 20 MEQ tablet, Take 20 mEq by mouth once daily., Disp: , Rfl: 11    tramadol (ULTRAM) 50 mg tablet, Take 50 mg by mouth 4 (four) times daily., Disp: , Rfl: 2    TURMERIC-TURMERIC ROOT EXTRACT ORAL, Take by mouth., Disp: , Rfl:     zonisamide (ZONEGRAN) 100 MG Cap, Take by mouth. 4 Capsule Oral Every evening, Disp: , Rfl:     doxycycline (MONODOX) 100 MG capsule, Take 1 capsule (100 mg total) by mouth 2 (two) times daily. for 10 days, Disp: 20 capsule, Rfl: 0  Review of patient's allergies indicates:  No Known Allergies    Review of Systems   Constitutional: Negative for chills and fever.   Respiratory: Negative for shortness of breath and wheezing.    Cardiovascular: Positive for leg swelling. Negative for chest pain and palpitations.   Gastrointestinal: Negative for abdominal pain and blood in stool.   Skin: Positive for color change and rash.     HPI  BP's well controlled.  Cellulitis - went into the hospital for IV abx 7 days then sent home on bactrim. Seen outpatient here 11/28/18 and placed on bactrim for 10 days.  Completed therapy around 4 days ago and while the cellulitis has improved greatly - still not resolved.  She is here for another course of treatment.  Discussed her edema - she states that if she lies down flat in bed her legs are normal fluid level in am, but she has been falling asleep in chair  "sitting up.    Objective:   /78 (BP Location: Left arm, Patient Position: Sitting, BP Method: Medium (Manual))   Pulse 106   Temp 98.2 °F (36.8 °C) (Oral)   Resp 18   Ht 5' 1.2" (1.554 m)   Wt (!) 143.7 kg (316 lb 12.8 oz)   SpO2 95%   BMI 59.47 kg/m²      Physical Exam   Constitutional: She is oriented to person, place, and time. She is cooperative. No distress.   Eyes: Conjunctivae and EOM are normal.   Cardiovascular: Normal rate, regular rhythm and normal heart sounds.   Pulmonary/Chest: Effort normal and breath sounds normal.   Musculoskeletal: She exhibits edema.   2+ edema bilateral lower legs   Neurological: She is alert and oriented to person, place, and time. No cranial nerve deficit. Coordination and gait normal.   Skin: Skin is warm, dry and intact. She is not diaphoretic.   Right lower extremity - + erythema and mild tenderness along anterior aspect of right leg - c/w cellulitis - around 14 cm x 8 cm (much improved per patient.)   Psychiatric: She has a normal mood and affect. Her speech is normal and behavior is normal.   Nursing note and vitals reviewed.          Assessment:       1. Cellulitis of right lower extremity    2. Essential hypertension    3. Venous stasis            Plan:         Cellulitis of right lower extremity  Comments:  Start doxycycline.  She completed bactrim twice daily around 4 days ago.  RTC in around 7-10 days for recheck.    Essential hypertension  Comments:  Well controlled.    Venous stasis  Comments:  Elevate legs at night.  She has been falling asleep in chair - must elevate legs for edema to improve.  Discussed setting alarm in case she falls asleep at night to remember to go lie down in bed.  She states that she does not have any problem lying flat - no sob/orthopnea just likes to watch tv going to sleep in her chair in den.    Other orders  -     doxycycline (MONODOX) 100 MG capsule; Take 1 capsule (100 mg total) by mouth 2 (two) times daily. for 10 days  " Dispense: 20 capsule; Refill: 0        Patient Care Team:  Krista Casas MD as PCP - General (Family Medicine)  Teresa Koenig LPN as Care Coordinator (Internal Medicine)    Follow-up 7-10 days, for Calixto Minor - patrick layton.

## 2019-01-11 ENCOUNTER — TELEPHONE (OUTPATIENT)
Dept: FAMILY MEDICINE | Facility: CLINIC | Age: 64
End: 2019-01-11

## 2019-01-11 NOTE — TELEPHONE ENCOUNTER
----- Message from Danya Sharp sent at 1/11/2019  9:26 AM CST -----  Contact: Dana/[facility]  Dana called to speak with the nurse; she has a rash to her right lower extremity. She can be contacted at [phone].    Thanks,  Danya

## 2019-01-16 ENCOUNTER — OFFICE VISIT (OUTPATIENT)
Dept: FAMILY MEDICINE | Facility: CLINIC | Age: 64
End: 2019-01-16
Payer: COMMERCIAL

## 2019-01-16 VITALS
OXYGEN SATURATION: 94 % | DIASTOLIC BLOOD PRESSURE: 68 MMHG | WEIGHT: 293 LBS | HEIGHT: 61 IN | HEART RATE: 89 BPM | BODY MASS INDEX: 55.32 KG/M2 | TEMPERATURE: 98 F | SYSTOLIC BLOOD PRESSURE: 116 MMHG

## 2019-01-16 DIAGNOSIS — L03.119 RECURRENT CELLULITIS OF LOWER EXTREMITY: Primary | ICD-10-CM

## 2019-01-16 PROCEDURE — 99999 PR PBB SHADOW E&M-EST. PATIENT-LVL IV: ICD-10-PCS | Mod: PBBFAC,,, | Performed by: REGISTERED NURSE

## 2019-01-16 PROCEDURE — 3008F BODY MASS INDEX DOCD: CPT | Mod: CPTII,S$GLB,, | Performed by: REGISTERED NURSE

## 2019-01-16 PROCEDURE — 99999 PR PBB SHADOW E&M-EST. PATIENT-LVL IV: CPT | Mod: PBBFAC,,, | Performed by: REGISTERED NURSE

## 2019-01-16 PROCEDURE — 99213 PR OFFICE/OUTPT VISIT, EST, LEVL III, 20-29 MIN: ICD-10-PCS | Mod: S$GLB,,, | Performed by: REGISTERED NURSE

## 2019-01-16 PROCEDURE — 3074F PR MOST RECENT SYSTOLIC BLOOD PRESSURE < 130 MM HG: ICD-10-PCS | Mod: CPTII,S$GLB,, | Performed by: REGISTERED NURSE

## 2019-01-16 PROCEDURE — 99213 OFFICE O/P EST LOW 20 MIN: CPT | Mod: S$GLB,,, | Performed by: REGISTERED NURSE

## 2019-01-16 PROCEDURE — 3008F PR BODY MASS INDEX (BMI) DOCUMENTED: ICD-10-PCS | Mod: CPTII,S$GLB,, | Performed by: REGISTERED NURSE

## 2019-01-16 PROCEDURE — 3074F SYST BP LT 130 MM HG: CPT | Mod: CPTII,S$GLB,, | Performed by: REGISTERED NURSE

## 2019-01-16 PROCEDURE — 3078F DIAST BP <80 MM HG: CPT | Mod: CPTII,S$GLB,, | Performed by: REGISTERED NURSE

## 2019-01-16 PROCEDURE — 3078F PR MOST RECENT DIASTOLIC BLOOD PRESSURE < 80 MM HG: ICD-10-PCS | Mod: CPTII,S$GLB,, | Performed by: REGISTERED NURSE

## 2019-01-16 RX ORDER — DOXYCYCLINE 100 MG/1
100 CAPSULE ORAL EVERY 12 HOURS
Qty: 20 CAPSULE | Refills: 0 | Status: SHIPPED | OUTPATIENT
Start: 2019-01-16 | End: 2019-01-26

## 2019-01-17 ENCOUNTER — TELEPHONE (OUTPATIENT)
Dept: ADMINISTRATIVE | Facility: CLINIC | Age: 64
End: 2019-01-17

## 2019-01-17 NOTE — TELEPHONE ENCOUNTER
Home Health SOC 11/16/2018 to 01/14/2019 with [facility] , Dr Krista Casas. SN,.PT, OT services.

## 2019-01-21 ENCOUNTER — TELEPHONE (OUTPATIENT)
Dept: ADMINISTRATIVE | Facility: CLINIC | Age: 64
End: 2019-01-21

## 2019-01-28 NOTE — PROGRESS NOTES
Subjective:       Patient ID: April Castillo is a 63 y.o. female.    Chief Complaint   Patient presents with    Leg Pain       HPI    April Castillo is here today with c/o leg pain and recurrent infection.  She was ordered Doxycycline on 12/12/2018 per Dr. Mccall.  Chart reviewed, she has been on multiple rounds of ABX.  She reports taking medication as ordered, does well for short period of time but problem soon comes back.  She is now having pain, redness and swelling to the RLE.  Denies fever, chills or leg weeping.      Review of Systems   Constitutional: Negative for chills and fever.   Respiratory: Negative.    Cardiovascular: Negative.    Musculoskeletal: Positive for arthralgias, gait problem and joint swelling.   Skin: Positive for color change.   Neurological: Negative for light-headedness and headaches.       Review of patient's allergies indicates:  No Known Allergies    Patient Active Problem List   Diagnosis    Spinal stenosis    Gout with manifestations    Chronic pain    Bilateral pulmonary embolism    Essential hypertension       Current Outpatient Medications on File Prior to Visit   Medication Sig Dispense Refill    b complex vitamins tablet Take 1 tablet by mouth once daily.      cholecalciferol, vitamin D3, (VITAMIN D3) 5,000 unit Tab Take 5,000 Units by mouth once daily.      DULoxetine (CYMBALTA) 60 MG capsule Take 60 mg by mouth once daily.  11    ELIQUIS 5 mg Tab TAKE 2 TABLETS BY MOUTH TWICE DAILY UNTIL 12/11 THEN TAKE 1 TABLET TWICE DAILY FOR 6 MONTHS 60 tablet 0    FOLIC ACID/MULTIVIT-MIN/LUTEIN (CENTRUM SILVER ORAL) Take by mouth.      furosemide (LASIX) 40 MG tablet Take 40 mg by mouth 2 (two) times daily.  11    hydrocodone-acetaminophen 10-325mg (NORCO)  mg Tab Take by mouth. 1 Tablet Oral Four times a day      metoprolol tartrate (LOPRESSOR) 25 MG tablet Take 25 mg by mouth 2 (two) times daily.  3    potassium chloride SA (K-DUR,KLOR-CON) 20 MEQ tablet Take 20 mEq by  "mouth once daily.  11    tramadol (ULTRAM) 50 mg tablet Take 50 mg by mouth 4 (four) times daily.  2    TURMERIC-TURMERIC ROOT EXTRACT ORAL Take by mouth.      zonisamide (ZONEGRAN) 100 MG Cap Take by mouth. 4 Capsule Oral Every evening       No current facility-administered medications on file prior to visit.        Past medical, surgical, family and social histories have been reviewed today.        Objective:     Vitals:    01/16/19 1321   BP: 116/68   Pulse: 89   Temp: 98.4 °F (36.9 °C)   TempSrc: Oral   SpO2: (!) 94%   Weight: (!) 144.7 kg (319 lb 0.1 oz)   Height: 5' 1" (1.549 m)   PainSc:   6   PainLoc: Leg         Physical Exam   Constitutional: She is oriented to person, place, and time. She appears well-developed and well-nourished.   Neurological: She is alert and oriented to person, place, and time.   Skin:        Cellulitis noted to RLE w/out drainage.  Increased pain, erythema, and swelling to area noted.   Vitals reviewed.        Diagnosis       1. Recurrent cellulitis of lower extremity          Assessment/ Plan     Recurrent cellulitis of lower extremity  · Pt with recurrent cellulitis of the RLE, antibiotic refilled.  · Elevate legs, warm compresses as directed.  · Will sent to Dr. Clark Navarro for further evaluation.  -     Ambulatory referral to Infectious Disease  -     doxycycline (VIBRAMYCIN) 100 MG Cap; Take 1 capsule (100 mg total) by mouth every 12 (twelve) hours. for 10 days  Dispense: 20 capsule; Refill: 0      Follow-up in [facility] as needed.        ZAC Khan  [facility]   "

## 2019-02-07 ENCOUNTER — LAB VISIT (OUTPATIENT)
Dept: LAB | Facility: HOSPITAL | Age: 64
End: 2019-02-07
Attending: INTERNAL MEDICINE
Payer: COMMERCIAL

## 2019-02-07 DIAGNOSIS — L02.519 CELLULITIS AND ABSCESS OF HAND, EXCEPT FINGERS AND THUMB: Primary | ICD-10-CM

## 2019-02-07 DIAGNOSIS — L03.119 CELLULITIS AND ABSCESS OF HAND, EXCEPT FINGERS AND THUMB: Primary | ICD-10-CM

## 2019-02-07 LAB
ALBUMIN SERPL BCP-MCNC: 3.7 G/DL
ALP SERPL-CCNC: 91 U/L
ALT SERPL W/O P-5'-P-CCNC: 25 U/L
ANION GAP SERPL CALC-SCNC: 10 MMOL/L
AST SERPL-CCNC: 36 U/L
BASOPHILS # BLD AUTO: 0.06 K/UL
BASOPHILS NFR BLD: 0.8 %
BILIRUB SERPL-MCNC: 0.3 MG/DL
BUN SERPL-MCNC: 9 MG/DL
CALCIUM SERPL-MCNC: 10.1 MG/DL
CHLORIDE SERPL-SCNC: 105 MMOL/L
CO2 SERPL-SCNC: 23 MMOL/L
CREAT SERPL-MCNC: 0.7 MG/DL
DIFFERENTIAL METHOD: ABNORMAL
EOSINOPHIL # BLD AUTO: 0.2 K/UL
EOSINOPHIL NFR BLD: 2.8 %
ERYTHROCYTE [DISTWIDTH] IN BLOOD BY AUTOMATED COUNT: 14.7 %
EST. GFR  (AFRICAN AMERICAN): >60 ML/MIN/1.73 M^2
EST. GFR  (NON AFRICAN AMERICAN): >60 ML/MIN/1.73 M^2
GLUCOSE SERPL-MCNC: 86 MG/DL
HCT VFR BLD AUTO: 35.1 %
HGB BLD-MCNC: 10.4 G/DL
IMM GRANULOCYTES # BLD AUTO: 0.03 K/UL
IMM GRANULOCYTES NFR BLD AUTO: 0.4 %
LYMPHOCYTES # BLD AUTO: 2.7 K/UL
LYMPHOCYTES NFR BLD: 38 %
MCH RBC QN AUTO: 24.2 PG
MCHC RBC AUTO-ENTMCNC: 29.6 G/DL
MCV RBC AUTO: 82 FL
MONOCYTES # BLD AUTO: 0.8 K/UL
MONOCYTES NFR BLD: 11.1 %
NEUTROPHILS # BLD AUTO: 3.4 K/UL
NEUTROPHILS NFR BLD: 46.9 %
NRBC BLD-RTO: 0 /100 WBC
PLATELET # BLD AUTO: 371 K/UL
PMV BLD AUTO: 10.8 FL
POTASSIUM SERPL-SCNC: 4.2 MMOL/L
PROT SERPL-MCNC: 8 G/DL
RBC # BLD AUTO: 4.3 M/UL
SODIUM SERPL-SCNC: 138 MMOL/L
WBC # BLD AUTO: 7.22 K/UL

## 2019-02-07 PROCEDURE — 85025 COMPLETE CBC W/AUTO DIFF WBC: CPT

## 2019-02-07 PROCEDURE — 80053 COMPREHEN METABOLIC PANEL: CPT

## 2019-06-04 ENCOUNTER — TELEPHONE (OUTPATIENT)
Dept: FAMILY MEDICINE | Facility: CLINIC | Age: 64
End: 2019-06-04

## 2019-07-09 ENCOUNTER — OFFICE VISIT (OUTPATIENT)
Dept: FAMILY MEDICINE | Facility: CLINIC | Age: 64
End: 2019-07-09
Payer: COMMERCIAL

## 2019-07-09 VITALS
BODY MASS INDEX: 55.32 KG/M2 | HEART RATE: 89 BPM | DIASTOLIC BLOOD PRESSURE: 78 MMHG | SYSTOLIC BLOOD PRESSURE: 132 MMHG | WEIGHT: 293 LBS | TEMPERATURE: 98 F | HEIGHT: 61 IN | OXYGEN SATURATION: 99 %

## 2019-07-09 DIAGNOSIS — R35.0 URINARY FREQUENCY: Primary | ICD-10-CM

## 2019-07-09 DIAGNOSIS — I10 ESSENTIAL HYPERTENSION: ICD-10-CM

## 2019-07-09 DIAGNOSIS — M48.061 SPINAL STENOSIS OF LUMBAR REGION, UNSPECIFIED WHETHER NEUROGENIC CLAUDICATION PRESENT: ICD-10-CM

## 2019-07-09 DIAGNOSIS — E66.01 MORBID OBESITY WITH BMI OF 50.0-59.9, ADULT: ICD-10-CM

## 2019-07-09 LAB
BILIRUB SERPL-MCNC: NEGATIVE MG/DL
BLOOD URINE, POC: 250
COLOR, POC UA: NORMAL
GLUCOSE UR QL STRIP: NORMAL
KETONES UR QL STRIP: NEGATIVE
LEUKOCYTE ESTERASE URINE, POC: NORMAL
NITRITE, POC UA: POSITIVE
PH, POC UA: 5
PROTEIN, POC: NORMAL
SPECIFIC GRAVITY, POC UA: 1.02
UROBILINOGEN, POC UA: NORMAL

## 2019-07-09 PROCEDURE — 87077 CULTURE AEROBIC IDENTIFY: CPT | Mod: 59

## 2019-07-09 PROCEDURE — 3008F BODY MASS INDEX DOCD: CPT | Mod: CPTII,S$GLB,, | Performed by: FAMILY MEDICINE

## 2019-07-09 PROCEDURE — 3008F PR BODY MASS INDEX (BMI) DOCUMENTED: ICD-10-PCS | Mod: CPTII,S$GLB,, | Performed by: FAMILY MEDICINE

## 2019-07-09 PROCEDURE — 99999 PR PBB SHADOW E&M-EST. PATIENT-LVL III: CPT | Mod: PBBFAC,,, | Performed by: FAMILY MEDICINE

## 2019-07-09 PROCEDURE — 81002 URINALYSIS NONAUTO W/O SCOPE: CPT | Mod: S$GLB,,, | Performed by: FAMILY MEDICINE

## 2019-07-09 PROCEDURE — 87088 URINE BACTERIA CULTURE: CPT

## 2019-07-09 PROCEDURE — 3075F SYST BP GE 130 - 139MM HG: CPT | Mod: CPTII,S$GLB,, | Performed by: FAMILY MEDICINE

## 2019-07-09 PROCEDURE — 3078F DIAST BP <80 MM HG: CPT | Mod: CPTII,S$GLB,, | Performed by: FAMILY MEDICINE

## 2019-07-09 PROCEDURE — 87086 URINE CULTURE/COLONY COUNT: CPT

## 2019-07-09 PROCEDURE — 99214 OFFICE O/P EST MOD 30 MIN: CPT | Mod: 25,S$GLB,, | Performed by: FAMILY MEDICINE

## 2019-07-09 PROCEDURE — 87186 SC STD MICRODIL/AGAR DIL: CPT | Mod: 59

## 2019-07-09 PROCEDURE — 81002 POCT URINE DIPSTICK WITHOUT MICROSCOPE: ICD-10-PCS | Mod: S$GLB,,, | Performed by: FAMILY MEDICINE

## 2019-07-09 PROCEDURE — 99214 PR OFFICE/OUTPT VISIT, EST, LEVL IV, 30-39 MIN: ICD-10-PCS | Mod: 25,S$GLB,, | Performed by: FAMILY MEDICINE

## 2019-07-09 PROCEDURE — 3078F PR MOST RECENT DIASTOLIC BLOOD PRESSURE < 80 MM HG: ICD-10-PCS | Mod: CPTII,S$GLB,, | Performed by: FAMILY MEDICINE

## 2019-07-09 PROCEDURE — 3075F PR MOST RECENT SYSTOLIC BLOOD PRESS GE 130-139MM HG: ICD-10-PCS | Mod: CPTII,S$GLB,, | Performed by: FAMILY MEDICINE

## 2019-07-09 PROCEDURE — 99999 PR PBB SHADOW E&M-EST. PATIENT-LVL III: ICD-10-PCS | Mod: PBBFAC,,, | Performed by: FAMILY MEDICINE

## 2019-07-09 RX ORDER — NITROFURANTOIN 25; 75 MG/1; MG/1
100 CAPSULE ORAL 2 TIMES DAILY
Qty: 14 CAPSULE | Refills: 0 | Status: SHIPPED | OUTPATIENT
Start: 2019-07-09 | End: 2020-07-16

## 2019-07-09 RX ORDER — POTASSIUM CHLORIDE 20 MEQ/1
20 TABLET, EXTENDED RELEASE ORAL DAILY
Qty: 30 TABLET | Refills: 5 | Status: SHIPPED | OUTPATIENT
Start: 2019-07-09

## 2019-07-09 NOTE — PROGRESS NOTES
CHIEF COMPLAINT:  This is a 64-year-old female complaining of urinary frequency.    SUBJECTIVE:  The patient complains of a 2 week history of urinary frequency.  She denies dysuria but complains of urgency with occasional incontinence.  Patient has lumbar stenosis and was told by her orthopedic surgeon that she would eventually have urinary frequency related to spinal stenosis.  Patient denies flank pain, hematuria,  abnormal urine odor, abdominal pain, nausea or vomiting.    Patient has essential hypertension which is controlled on metoprolol 25 mg twice daily.  She takes Lasix daily for lower extremity edema.  She requests refill potassium supplement.  Patient takes continuous opioids for chronic pain. She is morbidly obese with a BMI of 59.40.  Patient requests nutritional consultation.    ROS:  GENERAL: Patient denies fever, chills, night sweats.  Patient denies weight gain or loss. Patient denies anorexia, fatigue, weakness or swollen glands.  SKIN: Patient denies rash.  HEENT: Patient denies sore throat, ear pain, hearing loss, nasal congestion, or runny nose. Patient denies visual disturbance, eye irritation or discharge.  LUNGS: Patient denies cough, wheeze or hemoptysis.  CARDIOVASCULAR: Patient denies chest pain, shortness of breath, palpitations, or syncope.  Positive for lower extremity edema.  GI: Patient denies abdominal pain, nausea, vomiting, diarrhea, constipation, blood in stool or melena.  GENITOURINARY: Patient denies pelvic pain, vaginal discharge, itch or odor. Patient denies irregular vaginal bleeding.  Positive for urinary frequency.  MUSCULOSKELETAL: Patient denies joint swelling, redness or warmth.  Positive for joint pain.  NEUROLOGIC: Patient denies headache, vertigo, paresthesias, weakness in limb, dysarthria, dysphagia or abnormality of gait.  PSYCHIATRIC: Patient denies depression, anxiety or memory loss.    OBJECTIVE:   GENERAL: Well-developed well-nourished morbidly obese black  female alert and oriented x3 in no acute distress.  Memory, judgment and cognition without deficit.  SKIN: Clear without rash.  Normal color and tone.  HEENT: Eyes: Clear conjunctivae.  No scleral icterus.   NECK: Supple, normal range of motion.  No lymphadenopathy.  No masses or enlarged thyroid.  No JVD.  Carotids 2+ and equal.  No bruits.  LUNGS: Clear to auscultation.  Normal respiratory effort.  CARDIOVASCULAR: Regular rhythm, normal S1, S2 without murmur, gallop or rub.  BACK: No CVA or spinal tenderness.  ABDOMEN:  Protuberant.  Soft, nontender without mass or organomegaly.  No rebound or guarding.  EXTREMITIES: Without cyanosis or clubbing.  1+ ankle edema with stasis dermatitis changes.  Distal pulses  2+ and equal.  Normal range of motion in all extremities.  No joint effusion, erythema or warmth.  NEUROLOGIC:    Gait unsteady without walker support.  No tremor.      UA dip:  2+ leukocytes.  Positive nitrates.  2+ blood.    ASSESSMENT:  1. Urinary frequency    2. Spinal stenosis of lumbar region, unspecified whether neurogenic claudication present    3. Essential hypertension    4. Morbid obesity with BMI of 50.0-59.9, adult      PLAN:   1.  Macrodantin 100 mg twice daily for 1 week.  2.  Urine culture and sensitivity.  3.  Consider treatment for overactive bladder.  4.  Refill potassium chloride.  5.  Weight reduction encouraged.  6.  Low-fat limited carbohydrate diet.  7.  Increase physical activity.  8.  Follow-up if no improvement or worsening symptoms.  9.  Contact health insurance regarding coverage for dietitian.    This note is generated with speech recognition software and is subject to transcription error and sound alike phrases that may be missed by proofreading.

## 2019-07-12 LAB — BACTERIA UR CULT: ABNORMAL

## 2019-07-15 RX ORDER — NITROFURANTOIN 25; 75 MG/1; MG/1
CAPSULE ORAL
Qty: 14 CAPSULE | Refills: 0 | OUTPATIENT
Start: 2019-07-15

## 2019-07-25 ENCOUNTER — TELEPHONE (OUTPATIENT)
Dept: FAMILY MEDICINE | Facility: CLINIC | Age: 64
End: 2019-07-25

## 2019-07-25 RX ORDER — TOLTERODINE 4 MG/1
4 CAPSULE, EXTENDED RELEASE ORAL DAILY
Qty: 30 CAPSULE | Refills: 3 | Status: SHIPPED | OUTPATIENT
Start: 2019-07-25 | End: 2019-10-25 | Stop reason: SDUPTHER

## 2019-07-25 NOTE — TELEPHONE ENCOUNTER
Patient requesting a medication for incontinence problems that have continued since she completed the antibiotics (prescribed on 7/9/19 for urinary frequency), or does she need to come in?  [facility] [address]    ----- Message from Megan Davey sent at 7/25/2019  2:37 PM CDT -----  Type:  Needs Medical Advice    Who Called:  Pt  April  Symptoms (please be specific):      How long has patient had these symptoms:     Pharmacy name and phone #:   [facility] [address] [facility]//  [phone]  Would the patient rather a call back or a response via [facility]?   Call back  Best Call Back Number:   [phone]                                                                                Additional Information:  States she saw Dr Mesa recently for UTI////she is calling now to speak with your office regarding her incontinence problem//if possible to have a  Medication sent in//please call to discuss//gino/fili

## 2019-08-30 ENCOUNTER — TELEPHONE (OUTPATIENT)
Dept: FAMILY MEDICINE | Facility: CLINIC | Age: 64
End: 2019-08-30

## 2019-08-30 NOTE — TELEPHONE ENCOUNTER
----- Message from Ana Sanchez sent at 8/30/2019  9:28 AM CDT -----  Contact: Self  Patient requesting a call back regarding her BP medication. She would like to know if it can be changed. Please call patient back at [phone]

## 2019-08-30 NOTE — TELEPHONE ENCOUNTER
Patient states she have been seeing Calixto. Patient states can Calixto change her to a BP med that help with weight loss, spirolactone. She stated her sister is on this and it helps her a lot. She read information on this med and would like to try.

## 2019-08-30 NOTE — TELEPHONE ENCOUNTER
----- Message from Ana Sanchez sent at 8/30/2019  9:28 AM CDT -----  Contact: Self  Patient requesting a call back regarding her BP medication. She would like to know if it can be changed. Please call patient back at [phone]

## 2019-09-03 NOTE — TELEPHONE ENCOUNTER
"Diuretics do not cause "true" weight loss, likely losing fluid.  She is already on a diuretic.  If she wants to discuss further, she can schedule an appt.  "

## 2019-09-12 ENCOUNTER — PATIENT OUTREACH (OUTPATIENT)
Dept: ADMINISTRATIVE | Facility: HOSPITAL | Age: 64
End: 2019-09-12

## 2019-10-25 DIAGNOSIS — Z12.11 COLON CANCER SCREENING: ICD-10-CM

## 2019-10-25 RX ORDER — TOLTERODINE 4 MG/1
4 CAPSULE, EXTENDED RELEASE ORAL DAILY
Qty: 30 CAPSULE | Refills: 2 | Status: SHIPPED | OUTPATIENT
Start: 2019-10-25 | End: 2019-12-30

## 2019-12-13 ENCOUNTER — PATIENT OUTREACH (OUTPATIENT)
Dept: ADMINISTRATIVE | Facility: HOSPITAL | Age: 64
End: 2019-12-13

## 2019-12-30 RX ORDER — TOLTERODINE 4 MG/1
4 CAPSULE, EXTENDED RELEASE ORAL DAILY
Qty: 30 CAPSULE | Refills: 0 | Status: SHIPPED | OUTPATIENT
Start: 2019-12-30 | End: 2020-02-24

## 2020-01-23 RX ORDER — TOLTERODINE 4 MG/1
4 CAPSULE, EXTENDED RELEASE ORAL DAILY
Qty: 30 CAPSULE | Refills: 2 | OUTPATIENT
Start: 2020-01-23 | End: 2021-01-22

## 2020-02-24 RX ORDER — TOLTERODINE 4 MG/1
4 CAPSULE, EXTENDED RELEASE ORAL DAILY
Qty: 30 CAPSULE | Refills: 0 | Status: SHIPPED | OUTPATIENT
Start: 2020-02-24 | End: 2020-02-25

## 2020-02-25 RX ORDER — TOLTERODINE 4 MG/1
4 CAPSULE, EXTENDED RELEASE ORAL DAILY
Qty: 30 CAPSULE | Refills: 0 | Status: SHIPPED | OUTPATIENT
Start: 2020-02-25 | End: 2020-03-21

## 2020-03-21 RX ORDER — TOLTERODINE 4 MG/1
4 CAPSULE, EXTENDED RELEASE ORAL DAILY
Qty: 30 CAPSULE | Refills: 0 | Status: SHIPPED | OUTPATIENT
Start: 2020-03-21 | End: 2020-04-15

## 2020-04-15 RX ORDER — TOLTERODINE 4 MG/1
4 CAPSULE, EXTENDED RELEASE ORAL DAILY
Qty: 30 CAPSULE | Refills: 0 | Status: SHIPPED | OUTPATIENT
Start: 2020-04-15 | End: 2021-03-16 | Stop reason: SDUPTHER

## 2020-05-09 RX ORDER — TOLTERODINE 4 MG/1
4 CAPSULE, EXTENDED RELEASE ORAL DAILY
Qty: 30 CAPSULE | Refills: 0 | OUTPATIENT
Start: 2020-05-09 | End: 2021-05-09

## 2020-05-13 ENCOUNTER — PATIENT OUTREACH (OUTPATIENT)
Dept: ADMINISTRATIVE | Facility: HOSPITAL | Age: 65
End: 2020-05-13

## 2020-05-18 RX ORDER — TOLTERODINE 4 MG/1
4 CAPSULE, EXTENDED RELEASE ORAL DAILY
Qty: 30 CAPSULE | Refills: 0 | OUTPATIENT
Start: 2020-05-18 | End: 2021-05-18

## 2020-05-23 NOTE — PROGRESS NOTES
Contacted patient to follow up on overdue fit kit. Mailbox is full, unable to leave a message    yes

## 2020-07-16 NOTE — PROGRESS NOTES
Subjective:       Patient ID: April Castillo is a 65 y.o. female.    Chief Complaint   Patient presents with    Cellulitis       HPI    Patient here today with her granddaughter, with c/o recurrent cellultis to the LLE.  Was seen on Wednesday 7/15/2020 at [facility] in [facility] near her home --- dx with cellulitis, started on Bactrim-DS.  Since that time, she has had increased redness, swelling and pain.  Blisters formed last night.  Admits to not elevating frequently.  No f/c.  She thinks started as a mosquito bite since she has been itching and scratching prior to the infection setting in.  Also has been picking at her left great toenail to cause a possible infection.      Review of Systems   Constitutional: Positive for activity change. Negative for chills and fever.   Respiratory: Negative.    Cardiovascular: Negative.    Musculoskeletal: Positive for gait problem and joint swelling.   Skin: Positive for color change, rash and wound.   Neurological: Positive for weakness. Negative for light-headedness and headaches.         Review of patient's allergies indicates:  No Known Allergies      Patient Active Problem List   Diagnosis    Spinal stenosis    Gout with manifestations    Chronic pain    Bilateral pulmonary embolism    Essential hypertension         Current Outpatient Medications:     b complex vitamins tablet, Take 1 tablet by mouth once daily., Disp: , Rfl:     cholecalciferol, vitamin D3, (VITAMIN D3) 5,000 unit Tab, Take 5,000 Units by mouth once daily., Disp: , Rfl:     DULoxetine (CYMBALTA) 60 MG capsule, Take 60 mg by mouth once daily., Disp: , Rfl: 11    ELIQUIS 5 mg Tab, TAKE 2 TABLETS BY MOUTH TWICE DAILY UNTIL 12/11 THEN TAKE 1 TABLET TWICE DAILY FOR 6 MONTHS, Disp: 60 tablet, Rfl: 0    FOLIC ACID/MULTIVIT-MIN/LUTEIN (CENTRUM SILVER ORAL), Take by mouth., Disp: , Rfl:     furosemide (LASIX) 40 MG tablet, Take 40 mg by mouth 2 (two) times daily., Disp: , Rfl: 11     "hydrocodone-acetaminophen 10-325mg (NORCO)  mg Tab, Take by mouth. 1 Tablet Oral Four times a day, Disp: , Rfl:     metoprolol tartrate (LOPRESSOR) 25 MG tablet, Take 25 mg by mouth 2 (two) times daily., Disp: , Rfl: 3    potassium chloride SA (K-DUR,KLOR-CON) 20 MEQ tablet, Take 1 tablet (20 mEq total) by mouth once daily., Disp: 30 tablet, Rfl: 5    tolterodine (DETROL LA) 4 MG 24 hr capsule, TAKE 1 CAPSULE (4 MG TOTAL) BY MOUTH ONCE DAILY., Disp: 30 capsule, Rfl: 0    tramadol (ULTRAM) 50 mg tablet, Take 50 mg by mouth 4 (four) times daily., Disp: , Rfl: 2    TURMERIC-TURMERIC ROOT EXTRACT ORAL, Take by mouth., Disp: , Rfl:     zonisamide (ZONEGRAN) 100 MG Cap, Take by mouth. 4 Capsule Oral Every evening, Disp: , Rfl:         Past medical, surgical, family and social histories have been reviewed today.      Objective:     Vitals:    07/17/20 1125   BP: 106/70   Pulse: 106   Temp: 97.3 °F (36.3 °C)   TempSrc: Oral   Weight: 128.9 kg (284 lb 2.8 oz)   Height: 5' 1" (1.549 m)   PainSc:   7   PainLoc: Leg         Physical Exam  Constitutional:       General: She is not in acute distress (mainly just uncomfortable).  Skin:     General: Skin is warm.          Neurological:      Mental Status: She is alert and oriented to person, place, and time.      Gait: Gait abnormal.   Psychiatric:         Mood and Affect: Mood normal.         Behavior: Behavior normal.         Thought Content: Thought content normal.         Judgment: Judgment normal.           SEE MEDIA SECTION FOR UPLOADED PHOTOS FROM APPT TODAY.        Diagnosis       1. Cellulitis of left lower extremity    2. Essential hypertension    3. Bilateral pulmonary embolism    4. Morbid obesity with BMI of 50.0-59.9, adult          Assessment/ Plan     Cellulitis of left lower extremity --- worsening, recently started Bactrim-DS per urgent care.  Elevate frequently.  Continue antibiotic.  -     cefTRIAXone injection 1 g    Essential hypertension -- " stable, controlled.    Bilateral pulmonary embolism --- stable, on Eliquis.     Morbid obesity with BMI of 50.0-59.9, adult --- healthy diet, stay active as much as possible.        Follow-up:  Return tomorrow in clinic for recheck.  Return prn.        ZAC Khan  [facility]

## 2020-07-17 ENCOUNTER — OFFICE VISIT (OUTPATIENT)
Dept: FAMILY MEDICINE | Facility: CLINIC | Age: 65
End: 2020-07-17
Payer: COMMERCIAL

## 2020-07-17 VITALS
DIASTOLIC BLOOD PRESSURE: 70 MMHG | HEART RATE: 106 BPM | TEMPERATURE: 97 F | SYSTOLIC BLOOD PRESSURE: 106 MMHG | BODY MASS INDEX: 53.66 KG/M2 | HEIGHT: 61 IN | WEIGHT: 284.19 LBS

## 2020-07-17 DIAGNOSIS — I26.99 BILATERAL PULMONARY EMBOLISM: ICD-10-CM

## 2020-07-17 DIAGNOSIS — L03.116 CELLULITIS OF LEFT LOWER EXTREMITY: Primary | ICD-10-CM

## 2020-07-17 DIAGNOSIS — I10 ESSENTIAL HYPERTENSION: ICD-10-CM

## 2020-07-17 DIAGNOSIS — Z12.39 BREAST CANCER SCREENING: ICD-10-CM

## 2020-07-17 DIAGNOSIS — E66.01 MORBID OBESITY WITH BMI OF 50.0-59.9, ADULT: ICD-10-CM

## 2020-07-17 PROCEDURE — 3078F DIAST BP <80 MM HG: CPT | Mod: CPTII,S$GLB,, | Performed by: REGISTERED NURSE

## 2020-07-17 PROCEDURE — 3008F PR BODY MASS INDEX (BMI) DOCUMENTED: ICD-10-PCS | Mod: CPTII,S$GLB,, | Performed by: REGISTERED NURSE

## 2020-07-17 PROCEDURE — 3078F PR MOST RECENT DIASTOLIC BLOOD PRESSURE < 80 MM HG: ICD-10-PCS | Mod: CPTII,S$GLB,, | Performed by: REGISTERED NURSE

## 2020-07-17 PROCEDURE — 99999 PR PBB SHADOW E&M-EST. PATIENT-LVL IV: CPT | Mod: PBBFAC,,, | Performed by: REGISTERED NURSE

## 2020-07-17 PROCEDURE — 96372 PR INJECTION,THERAP/PROPH/DIAG2ST, IM OR SUBCUT: ICD-10-PCS | Mod: S$GLB,,, | Performed by: REGISTERED NURSE

## 2020-07-17 PROCEDURE — 3074F SYST BP LT 130 MM HG: CPT | Mod: CPTII,S$GLB,, | Performed by: REGISTERED NURSE

## 2020-07-17 PROCEDURE — 99213 OFFICE O/P EST LOW 20 MIN: CPT | Mod: 25,S$GLB,, | Performed by: REGISTERED NURSE

## 2020-07-17 PROCEDURE — 99213 PR OFFICE/OUTPT VISIT, EST, LEVL III, 20-29 MIN: ICD-10-PCS | Mod: 25,S$GLB,, | Performed by: REGISTERED NURSE

## 2020-07-17 PROCEDURE — 99999 PR PBB SHADOW E&M-EST. PATIENT-LVL IV: ICD-10-PCS | Mod: PBBFAC,,, | Performed by: REGISTERED NURSE

## 2020-07-17 PROCEDURE — 96372 THER/PROPH/DIAG INJ SC/IM: CPT | Mod: S$GLB,,, | Performed by: REGISTERED NURSE

## 2020-07-17 PROCEDURE — 3008F BODY MASS INDEX DOCD: CPT | Mod: CPTII,S$GLB,, | Performed by: REGISTERED NURSE

## 2020-07-17 PROCEDURE — 3074F PR MOST RECENT SYSTOLIC BLOOD PRESSURE < 130 MM HG: ICD-10-PCS | Mod: CPTII,S$GLB,, | Performed by: REGISTERED NURSE

## 2020-07-17 RX ORDER — CEFTRIAXONE 1 G/1
1 INJECTION, POWDER, FOR SOLUTION INTRAMUSCULAR; INTRAVENOUS
Status: COMPLETED | OUTPATIENT
Start: 2020-07-17 | End: 2020-07-17

## 2020-07-17 RX ORDER — ITRACONAZOLE 100 MG/1
CAPSULE ORAL
COMMUNITY
Start: 2020-06-06 | End: 2021-08-02

## 2020-07-17 RX ORDER — CYCLOBENZAPRINE HCL 10 MG
TABLET ORAL
COMMUNITY
Start: 2020-06-06 | End: 2021-08-02

## 2020-07-17 RX ADMIN — CEFTRIAXONE 1 G: 1 INJECTION, POWDER, FOR SOLUTION INTRAMUSCULAR; INTRAVENOUS at 12:07

## 2020-07-18 ENCOUNTER — OFFICE VISIT (OUTPATIENT)
Dept: FAMILY MEDICINE | Facility: CLINIC | Age: 65
End: 2020-07-18
Payer: COMMERCIAL

## 2020-07-18 VITALS
BODY MASS INDEX: 54.69 KG/M2 | DIASTOLIC BLOOD PRESSURE: 64 MMHG | TEMPERATURE: 98 F | OXYGEN SATURATION: 99 % | WEIGHT: 289.69 LBS | HEART RATE: 111 BPM | HEIGHT: 61 IN | SYSTOLIC BLOOD PRESSURE: 108 MMHG

## 2020-07-18 DIAGNOSIS — L03.116 LEFT LEG CELLULITIS: Primary | ICD-10-CM

## 2020-07-18 PROCEDURE — 99999 PR PBB SHADOW E&M-EST. PATIENT-LVL IV: CPT | Mod: PBBFAC,,, | Performed by: REGISTERED NURSE

## 2020-07-18 PROCEDURE — 99213 PR OFFICE/OUTPT VISIT, EST, LEVL III, 20-29 MIN: ICD-10-PCS | Mod: S$GLB,,, | Performed by: REGISTERED NURSE

## 2020-07-18 PROCEDURE — 3074F PR MOST RECENT SYSTOLIC BLOOD PRESSURE < 130 MM HG: ICD-10-PCS | Mod: CPTII,S$GLB,, | Performed by: REGISTERED NURSE

## 2020-07-18 PROCEDURE — 3078F DIAST BP <80 MM HG: CPT | Mod: CPTII,S$GLB,, | Performed by: REGISTERED NURSE

## 2020-07-18 PROCEDURE — 3008F BODY MASS INDEX DOCD: CPT | Mod: CPTII,S$GLB,, | Performed by: REGISTERED NURSE

## 2020-07-18 PROCEDURE — 3078F PR MOST RECENT DIASTOLIC BLOOD PRESSURE < 80 MM HG: ICD-10-PCS | Mod: CPTII,S$GLB,, | Performed by: REGISTERED NURSE

## 2020-07-18 PROCEDURE — 99213 OFFICE O/P EST LOW 20 MIN: CPT | Mod: S$GLB,,, | Performed by: REGISTERED NURSE

## 2020-07-18 PROCEDURE — 99999 PR PBB SHADOW E&M-EST. PATIENT-LVL IV: ICD-10-PCS | Mod: PBBFAC,,, | Performed by: REGISTERED NURSE

## 2020-07-18 PROCEDURE — 3008F PR BODY MASS INDEX (BMI) DOCUMENTED: ICD-10-PCS | Mod: CPTII,S$GLB,, | Performed by: REGISTERED NURSE

## 2020-07-18 PROCEDURE — 3074F SYST BP LT 130 MM HG: CPT | Mod: CPTII,S$GLB,, | Performed by: REGISTERED NURSE

## 2020-07-18 NOTE — PROGRESS NOTES
Subjective:       Patient ID: April Castillo is a 65 y.o. female.    Chief Complaint   Patient presents with    Follow-up       HPI    Patient returns today not doing much better.    She continues the Bactrim-DS, did get her antibiotic injection yesterday.    Woke up this morning worse.    The blisters have enlarged, severe weeping of fluid.    More pain and redness.        Review of Systems   Constitutional: Negative for chills and fever.   Musculoskeletal: Positive for gait problem and joint swelling.   Skin: Positive for color change, rash and wound.         Review of patient's allergies indicates:  No Known Allergies      Patient Active Problem List   Diagnosis    Spinal stenosis    Gout with manifestations    Chronic pain    Bilateral pulmonary embolism    Essential hypertension    Morbid obesity with BMI of 50.0-59.9, adult         Current Outpatient Medications:     b complex vitamins tablet, Take 1 tablet by mouth once daily., Disp: , Rfl:     cholecalciferol, vitamin D3, (VITAMIN D3) 5,000 unit Tab, Take 5,000 Units by mouth once daily., Disp: , Rfl:     cyclobenzaprine (FLEXERIL) 10 MG tablet, TAKE 1 TABLET ORALLY ONCE A DAY AT NIGHT AS NEEDED., Disp: , Rfl:     DULoxetine (CYMBALTA) 60 MG capsule, Take 60 mg by mouth once daily., Disp: , Rfl: 11    ELIQUIS 5 mg Tab, TAKE 2 TABLETS BY MOUTH TWICE DAILY UNTIL 12/11 THEN TAKE 1 TABLET TWICE DAILY FOR 6 MONTHS, Disp: 60 tablet, Rfl: 0    FOLIC ACID/MULTIVIT-MIN/LUTEIN (CENTRUM SILVER ORAL), Take by mouth., Disp: , Rfl:     furosemide (LASIX) 40 MG tablet, Take 40 mg by mouth 2 (two) times daily., Disp: , Rfl: 11    hydrocodone-acetaminophen 10-325mg (NORCO)  mg Tab, Take by mouth. 1 Tablet Oral Four times a day, Disp: , Rfl:     itraconazole (SPORANOX) 100 mg Cap, TAKE 2 CAPSULES AFTER MEALS ONCE A DAY ORALLY 7 DAYS, Disp: , Rfl:     metoprolol tartrate (LOPRESSOR) 25 MG tablet, Take 25 mg by mouth 2 (two) times daily., Disp: , Rfl: 3     "potassium chloride SA (K-DUR,KLOR-CON) 20 MEQ tablet, Take 1 tablet (20 mEq total) by mouth once daily., Disp: 30 tablet, Rfl: 5    tolterodine (DETROL LA) 4 MG 24 hr capsule, TAKE 1 CAPSULE (4 MG TOTAL) BY MOUTH ONCE DAILY. (Patient not taking: Reported on 7/17/2020), Disp: 30 capsule, Rfl: 0    tramadol (ULTRAM) 50 mg tablet, Take 50 mg by mouth 4 (four) times daily., Disp: , Rfl: 2    TURMERIC-TURMERIC ROOT EXTRACT ORAL, Take by mouth., Disp: , Rfl:     zonisamide (ZONEGRAN) 100 MG Cap, Take by mouth. 4 Capsule Oral Every evening, Disp: , Rfl:   No current facility-administered medications for this visit.         Past medical, surgical, family and social histories have been reviewed today.      Objective:     Vitals:    07/18/20 1103   BP: 108/64   Pulse: (!) 111   Temp: 97.7 °F (36.5 °C)   TempSrc: Oral   SpO2: 99%   Weight: 131.4 kg (289 lb 11 oz)   Height: 5' 1" (1.549 m)   PainSc:   6         Physical Exam  Constitutional:       General: She is not in acute distress.  Skin:         Neurological:      Mental Status: She is alert and oriented to person, place, and time.           Diagnosis       1. Left leg cellulitis          Assessment/ Plan     Left leg cellulitis  Worsening condition in past 24 hours --- redness and swelling now to knee joint, more blisters & weeping.  Discussed need for IV antibiotics and extensive evaluation.  She will proceed to ED now.  Family member here today to take patient to ED.      Follow-up:  Return prn.      ZAC Khan  [facility]     "

## 2020-08-07 ENCOUNTER — TELEPHONE (OUTPATIENT)
Dept: FAMILY MEDICINE | Facility: CLINIC | Age: 65
End: 2020-08-07

## 2020-08-07 NOTE — TELEPHONE ENCOUNTER
----- Message from Justina Okeefe sent at 8/7/2020 12:10 PM CDT -----  Type:  Same Day Appointment Request    Caller is requesting a same day appointment.  Caller declined first available appointment listed below.    Name of Caller:pt  When is the first available appointment?8/10  Symptoms:cellulitis LT leg  Best Call Back Number:[phone]  Additional Information: #    Thank you

## 2020-08-11 ENCOUNTER — OFFICE VISIT (OUTPATIENT)
Dept: FAMILY MEDICINE | Facility: CLINIC | Age: 65
End: 2020-08-11
Payer: COMMERCIAL

## 2020-08-11 VITALS
HEIGHT: 61 IN | BODY MASS INDEX: 55.32 KG/M2 | TEMPERATURE: 97 F | HEART RATE: 76 BPM | SYSTOLIC BLOOD PRESSURE: 130 MMHG | WEIGHT: 293 LBS | DIASTOLIC BLOOD PRESSURE: 80 MMHG

## 2020-08-11 DIAGNOSIS — Z09 HOSPITAL DISCHARGE FOLLOW-UP: Primary | ICD-10-CM

## 2020-08-11 DIAGNOSIS — L03.116 CELLULITIS OF LEFT LEG: ICD-10-CM

## 2020-08-11 PROCEDURE — 99999 PR PBB SHADOW E&M-EST. PATIENT-LVL III: CPT | Mod: PBBFAC,,, | Performed by: REGISTERED NURSE

## 2020-08-11 PROCEDURE — 3079F PR MOST RECENT DIASTOLIC BLOOD PRESSURE 80-89 MM HG: ICD-10-PCS | Mod: CPTII,S$GLB,, | Performed by: REGISTERED NURSE

## 2020-08-11 PROCEDURE — 3075F PR MOST RECENT SYSTOLIC BLOOD PRESS GE 130-139MM HG: ICD-10-PCS | Mod: CPTII,S$GLB,, | Performed by: REGISTERED NURSE

## 2020-08-11 PROCEDURE — 99214 OFFICE O/P EST MOD 30 MIN: CPT | Mod: S$GLB,,, | Performed by: REGISTERED NURSE

## 2020-08-11 PROCEDURE — 3008F PR BODY MASS INDEX (BMI) DOCUMENTED: ICD-10-PCS | Mod: CPTII,S$GLB,, | Performed by: REGISTERED NURSE

## 2020-08-11 PROCEDURE — 99214 PR OFFICE/OUTPT VISIT, EST, LEVL IV, 30-39 MIN: ICD-10-PCS | Mod: S$GLB,,, | Performed by: REGISTERED NURSE

## 2020-08-11 PROCEDURE — 3079F DIAST BP 80-89 MM HG: CPT | Mod: CPTII,S$GLB,, | Performed by: REGISTERED NURSE

## 2020-08-11 PROCEDURE — 3008F BODY MASS INDEX DOCD: CPT | Mod: CPTII,S$GLB,, | Performed by: REGISTERED NURSE

## 2020-08-11 PROCEDURE — 99999 PR PBB SHADOW E&M-EST. PATIENT-LVL III: ICD-10-PCS | Mod: PBBFAC,,, | Performed by: REGISTERED NURSE

## 2020-08-11 PROCEDURE — 3075F SYST BP GE 130 - 139MM HG: CPT | Mod: CPTII,S$GLB,, | Performed by: REGISTERED NURSE

## 2020-08-11 RX ORDER — SULFAMETHOXAZOLE AND TRIMETHOPRIM 800; 160 MG/1; MG/1
1 TABLET ORAL 2 TIMES DAILY
COMMUNITY
Start: 2020-07-23 | End: 2021-03-16

## 2020-08-11 NOTE — PROGRESS NOTES
Subjective:       Patient ID: April Castillo is a 65 y.o. female.    Chief Complaint   Patient presents with    Hospital Follow Up         HOSPITAL:  [facility]  Admitted: 7/18/2020  Discharged: 7/23/2020        HPI    April Castillo is here today for follow-up of recent hospitalization.  I have reviewed the patient's medical history in detail and updated the computerized patient record.    Last seen by me on 7/18/2020 for cellultis, sent to ED for evaluation.  She was admitted for IV antibiotics, did much better after 2 days.  Was sent home on Bactrim-DS which she has completed.  [facility] visiting for leg/wound care.  Reports labs in hospital normal.  COVID negative.  Pain improving, now a 5/10 on pain scale.  Swelling and redness much improved.        Review of Systems   Constitutional: Negative for chills and fever.   Respiratory: Negative.    Cardiovascular: Negative.    Musculoskeletal: Positive for joint swelling.   Integumentary:         Cellulitis          Review of patient's allergies indicates:  No Known Allergies      Patient Active Problem List   Diagnosis    Spinal stenosis    Gout with manifestations    Chronic pain    Bilateral pulmonary embolism    Essential hypertension    Morbid obesity with BMI of 50.0-59.9, adult         Current Outpatient Medications:     cholecalciferol, vitamin D3, (VITAMIN D3) 5,000 unit Tab, Take 5,000 Units by mouth once daily., Disp: , Rfl:     DULoxetine (CYMBALTA) 60 MG capsule, Take 60 mg by mouth once daily., Disp: , Rfl: 11    ELIQUIS 5 mg Tab, TAKE 2 TABLETS BY MOUTH TWICE DAILY UNTIL 12/11 THEN TAKE 1 TABLET TWICE DAILY FOR 6 MONTHS, Disp: 60 tablet, Rfl: 0    furosemide (LASIX) 40 MG tablet, Take 40 mg by mouth 2 (two) times daily., Disp: , Rfl: 11    hydrocodone-acetaminophen 10-325mg (NORCO)  mg Tab, Take by mouth. 1 Tablet Oral Four times a day, Disp: , Rfl:     metoprolol tartrate (LOPRESSOR) 25 MG tablet, Take 25 mg by mouth 2 (two)  "times daily., Disp: , Rfl: 3    potassium chloride SA (K-DUR,KLOR-CON) 20 MEQ tablet, Take 1 tablet (20 mEq total) by mouth once daily., Disp: 30 tablet, Rfl: 5    tramadol (ULTRAM) 50 mg tablet, Take 50 mg by mouth 4 (four) times daily., Disp: , Rfl: 2    TURMERIC-TURMERIC ROOT EXTRACT ORAL, Take by mouth., Disp: , Rfl:     zonisamide (ZONEGRAN) 100 MG Cap, Take by mouth. 4 Capsule Oral Every evening, Disp: , Rfl:     b complex vitamins tablet, Take 1 tablet by mouth once daily., Disp: , Rfl:     cyclobenzaprine (FLEXERIL) 10 MG tablet, TAKE 1 TABLET ORALLY ONCE A DAY AT NIGHT AS NEEDED., Disp: , Rfl:     FOLIC ACID/MULTIVIT-MIN/LUTEIN (CENTRUM SILVER ORAL), Take by mouth., Disp: , Rfl:     itraconazole (SPORANOX) 100 mg Cap, TAKE 2 CAPSULES AFTER MEALS ONCE A DAY ORALLY 7 DAYS, Disp: , Rfl:     sulfamethoxazole-trimethoprim 800-160mg (BACTRIM DS) 800-160 mg Tab, Take 1 tablet by mouth 2 (two) times daily., Disp: , Rfl:     tolterodine (DETROL LA) 4 MG 24 hr capsule, TAKE 1 CAPSULE (4 MG TOTAL) BY MOUTH ONCE DAILY. (Patient not taking: Reported on 2020), Disp: 30 capsule, Rfl: 0      Past Medical History:   Diagnosis Date    Arthritis     Chronic pain     Gout with manifestations     History of abnormal cervical Pap smear     Repeat pap smear okay    Morbid obesity with BMI of 60.0-69.9, adult     Spinal stenosis        Past Surgical History:   Procedure Laterality Date    BACK SURGERY      bilateral tubal ligation       SECTION      Pain pump insertion (morphine)         Family History   Problem Relation Age of Onset    Hypertension Mother     Diabetes Father     Diabetes Sister     Multiple sclerosis Sister     Diabetes Mellitus Sister         Borderline    No Known Problems Daughter     Cancer Maternal Aunt         "blood cancer"    Stroke Neg Hx     Heart disease Neg Hx        Social History     Tobacco Use    Smoking status: Never Smoker    Smokeless tobacco: Never " "Used   Substance Use Topics    Alcohol use: No    Drug use: No           Objective:     Vitals:    08/11/20 0949   BP: 130/80   Pulse: 76   Temp: 97.1 °F (36.2 °C)   TempSrc: Oral   Weight: 135.1 kg (297 lb 13.5 oz)   Height: 5' 1" (1.549 m)           Physical Exam  Vitals signs reviewed.   HENT:      Head: Normocephalic and atraumatic.   Skin:         Neurological:      Mental Status: She is alert and oriented to person, place, and time.   Psychiatric:         Mood and Affect: Mood normal.         Behavior: Behavior normal.         Thought Content: Thought content normal.         Judgment: Judgment normal.           Diagnosis       1. Hospital discharge follow-up    2. Cellulitis of left leg          Assessment/ Plan     Hospital discharge follow-up    Cellulitis of left leg --- healing well, Home Health following.        Follow-up:  Return prn.      Patient Care Team:  Krista Casas MD as PCP - General (Family Medicine)  India Mesa MD as PCP - [facility] Attributed  Teresa Koenig LPN as Care Coordinator (Internal Medicine)      ZAC Khan  [facility]     "

## 2020-08-13 ENCOUNTER — PATIENT OUTREACH (OUTPATIENT)
Dept: ADMINISTRATIVE | Facility: HOSPITAL | Age: 65
End: 2020-08-13

## 2020-08-13 NOTE — PROGRESS NOTES
Called Pt to scheduled physical with a pap no answer. Voicemail  Full       Gertrude SALAZAR LPN Care Coordinator  Care Coordination Department  [facility]  [phone]

## 2020-09-24 ENCOUNTER — DOCUMENT SCAN (OUTPATIENT)
Dept: HOME HEALTH SERVICES | Facility: HOSPITAL | Age: 65
End: 2020-09-24
Payer: COMMERCIAL

## 2020-10-14 ENCOUNTER — TELEPHONE (OUTPATIENT)
Dept: FAMILY MEDICINE | Facility: CLINIC | Age: 65
End: 2020-10-14

## 2020-10-14 NOTE — TELEPHONE ENCOUNTER
Called pt and pt went to pain dr and is taking zonisamide and topamax wants to know if it is ok to take both of them takes one at night and the other one twice a day one in morning one in evening.     Please advise.

## 2020-10-14 NOTE — TELEPHONE ENCOUNTER
Pt understood and pt stating she is feeling bad and to help with weight but is not feeling good and is calling to make an appt with ordering MD

## 2020-10-14 NOTE — TELEPHONE ENCOUNTER
These drugs are both anti-convulsants and can have increased side effects if taken together.  But sometimes depending on how dosed, pts do okay.  However, she should discuss this with ordering MD.

## 2020-10-14 NOTE — TELEPHONE ENCOUNTER
----- Message from Slava Bowman sent at 10/14/2020  6:51 AM CDT -----  .Type:  Needs Medical Advice    Who Called: BRUNO FLETCHER   Symptoms (please be specific): vomiting   How long has patient had these symptoms: today  Pharmacy name and phone #:   Would the patient rather a call back or a response via [facility]?  Call   Best Call Back Number: [phone]   Additional Information:Pt is requesting a call back from the nurse in regards to the pt med zonisamide and topamax and her vomiting this morning please

## 2020-11-09 ENCOUNTER — PATIENT OUTREACH (OUTPATIENT)
Dept: ADMINISTRATIVE | Facility: HOSPITAL | Age: 65
End: 2020-11-09

## 2020-11-09 NOTE — PROGRESS NOTES
Returned call. Offered to schedule mammo. She declined saying her car is broke down and not sure when she can come in. States will call back when she can get in.

## 2021-03-16 ENCOUNTER — LAB VISIT (OUTPATIENT)
Dept: LAB | Facility: HOSPITAL | Age: 66
End: 2021-03-16
Payer: COMMERCIAL

## 2021-03-16 ENCOUNTER — OFFICE VISIT (OUTPATIENT)
Dept: FAMILY MEDICINE | Facility: CLINIC | Age: 66
End: 2021-03-16
Payer: COMMERCIAL

## 2021-03-16 VITALS
HEART RATE: 90 BPM | DIASTOLIC BLOOD PRESSURE: 84 MMHG | WEIGHT: 290.13 LBS | RESPIRATION RATE: 18 BRPM | TEMPERATURE: 98 F | OXYGEN SATURATION: 95 % | BODY MASS INDEX: 54.82 KG/M2 | SYSTOLIC BLOOD PRESSURE: 138 MMHG

## 2021-03-16 DIAGNOSIS — M48.061 SPINAL STENOSIS OF LUMBAR REGION, UNSPECIFIED WHETHER NEUROGENIC CLAUDICATION PRESENT: ICD-10-CM

## 2021-03-16 DIAGNOSIS — R53.83 FATIGUE, UNSPECIFIED TYPE: ICD-10-CM

## 2021-03-16 DIAGNOSIS — Z83.3 FAMILY HISTORY OF DIABETES MELLITUS (DM): ICD-10-CM

## 2021-03-16 DIAGNOSIS — E66.01 MORBID OBESITY WITH BMI OF 50.0-59.9, ADULT: ICD-10-CM

## 2021-03-16 DIAGNOSIS — Z76.0 MEDICATION REFILL: ICD-10-CM

## 2021-03-16 DIAGNOSIS — M77.9 TENDONITIS: Primary | ICD-10-CM

## 2021-03-16 LAB
BASOPHILS # BLD AUTO: 0.05 K/UL (ref 0–0.2)
BASOPHILS NFR BLD: 0.8 % (ref 0–1.9)
DIFFERENTIAL METHOD: ABNORMAL
EOSINOPHIL # BLD AUTO: 0.2 K/UL (ref 0–0.5)
EOSINOPHIL NFR BLD: 3.1 % (ref 0–8)
ERYTHROCYTE [DISTWIDTH] IN BLOOD BY AUTOMATED COUNT: 13.8 % (ref 11.5–14.5)
HCT VFR BLD AUTO: 38.6 % (ref 37–48.5)
HGB BLD-MCNC: 11.5 G/DL (ref 12–16)
IMM GRANULOCYTES # BLD AUTO: 0.01 K/UL (ref 0–0.04)
IMM GRANULOCYTES NFR BLD AUTO: 0.2 % (ref 0–0.5)
LYMPHOCYTES # BLD AUTO: 2.8 K/UL (ref 1–4.8)
LYMPHOCYTES NFR BLD: 46.9 % (ref 18–48)
MCH RBC QN AUTO: 25.7 PG (ref 27–31)
MCHC RBC AUTO-ENTMCNC: 29.8 G/DL (ref 32–36)
MCV RBC AUTO: 86 FL (ref 82–98)
MONOCYTES # BLD AUTO: 0.5 K/UL (ref 0.3–1)
MONOCYTES NFR BLD: 8.8 % (ref 4–15)
NEUTROPHILS # BLD AUTO: 2.4 K/UL (ref 1.8–7.7)
NEUTROPHILS NFR BLD: 40.2 % (ref 38–73)
NRBC BLD-RTO: 0 /100 WBC
PLATELET # BLD AUTO: 317 K/UL (ref 150–350)
PMV BLD AUTO: 10.3 FL (ref 9.2–12.9)
RBC # BLD AUTO: 4.47 M/UL (ref 4–5.4)
WBC # BLD AUTO: 6.04 K/UL (ref 3.9–12.7)

## 2021-03-16 PROCEDURE — 80053 COMPREHEN METABOLIC PANEL: CPT | Performed by: REGISTERED NURSE

## 2021-03-16 PROCEDURE — 99999 PR PBB SHADOW E&M-EST. PATIENT-LVL IV: CPT | Mod: PBBFAC,,, | Performed by: REGISTERED NURSE

## 2021-03-16 PROCEDURE — 99214 PR OFFICE/OUTPT VISIT, EST, LEVL IV, 30-39 MIN: ICD-10-PCS | Mod: S$GLB,,, | Performed by: REGISTERED NURSE

## 2021-03-16 PROCEDURE — 84443 ASSAY THYROID STIM HORMONE: CPT | Performed by: REGISTERED NURSE

## 2021-03-16 PROCEDURE — 85025 COMPLETE CBC W/AUTO DIFF WBC: CPT | Performed by: REGISTERED NURSE

## 2021-03-16 PROCEDURE — 83036 HEMOGLOBIN GLYCOSYLATED A1C: CPT | Performed by: REGISTERED NURSE

## 2021-03-16 PROCEDURE — 3075F PR MOST RECENT SYSTOLIC BLOOD PRESS GE 130-139MM HG: ICD-10-PCS | Mod: CPTII,S$GLB,, | Performed by: REGISTERED NURSE

## 2021-03-16 PROCEDURE — 99214 OFFICE O/P EST MOD 30 MIN: CPT | Mod: S$GLB,,, | Performed by: REGISTERED NURSE

## 2021-03-16 PROCEDURE — 3008F BODY MASS INDEX DOCD: CPT | Mod: CPTII,S$GLB,, | Performed by: REGISTERED NURSE

## 2021-03-16 PROCEDURE — 36415 COLL VENOUS BLD VENIPUNCTURE: CPT | Mod: PO | Performed by: REGISTERED NURSE

## 2021-03-16 PROCEDURE — 3079F PR MOST RECENT DIASTOLIC BLOOD PRESSURE 80-89 MM HG: ICD-10-PCS | Mod: CPTII,S$GLB,, | Performed by: REGISTERED NURSE

## 2021-03-16 PROCEDURE — 1125F AMNT PAIN NOTED PAIN PRSNT: CPT | Mod: S$GLB,,, | Performed by: REGISTERED NURSE

## 2021-03-16 PROCEDURE — 99999 PR PBB SHADOW E&M-EST. PATIENT-LVL IV: ICD-10-PCS | Mod: PBBFAC,,, | Performed by: REGISTERED NURSE

## 2021-03-16 PROCEDURE — 3079F DIAST BP 80-89 MM HG: CPT | Mod: CPTII,S$GLB,, | Performed by: REGISTERED NURSE

## 2021-03-16 PROCEDURE — 3075F SYST BP GE 130 - 139MM HG: CPT | Mod: CPTII,S$GLB,, | Performed by: REGISTERED NURSE

## 2021-03-16 PROCEDURE — 1125F PR PAIN SEVERITY QUANTIFIED, PAIN PRESENT: ICD-10-PCS | Mod: S$GLB,,, | Performed by: REGISTERED NURSE

## 2021-03-16 PROCEDURE — 3008F PR BODY MASS INDEX (BMI) DOCUMENTED: ICD-10-PCS | Mod: CPTII,S$GLB,, | Performed by: REGISTERED NURSE

## 2021-03-16 RX ORDER — TOLTERODINE 4 MG/1
4 CAPSULE, EXTENDED RELEASE ORAL DAILY
Qty: 30 CAPSULE | Refills: 0 | Status: SHIPPED | OUTPATIENT
Start: 2021-03-16 | End: 2021-05-17

## 2021-03-16 RX ORDER — CALCIUM CARBONATE 160(400)MG
TABLET,CHEWABLE ORAL
Qty: 1 EACH | Refills: 0 | Status: SHIPPED | OUTPATIENT
Start: 2021-03-16

## 2021-03-17 LAB
ALBUMIN SERPL BCP-MCNC: 3.6 G/DL (ref 3.5–5.2)
ALP SERPL-CCNC: 77 U/L (ref 55–135)
ALT SERPL W/O P-5'-P-CCNC: 14 U/L (ref 10–44)
ANION GAP SERPL CALC-SCNC: 11 MMOL/L (ref 8–16)
AST SERPL-CCNC: 16 U/L (ref 10–40)
BILIRUB SERPL-MCNC: 0.4 MG/DL (ref 0.1–1)
BUN SERPL-MCNC: 12 MG/DL (ref 8–23)
CALCIUM SERPL-MCNC: 9.3 MG/DL (ref 8.7–10.5)
CHLORIDE SERPL-SCNC: 105 MMOL/L (ref 95–110)
CO2 SERPL-SCNC: 23 MMOL/L (ref 23–29)
CREAT SERPL-MCNC: 0.9 MG/DL (ref 0.5–1.4)
EST. GFR  (AFRICAN AMERICAN): >60 ML/MIN/1.73 M^2
EST. GFR  (NON AFRICAN AMERICAN): >60 ML/MIN/1.73 M^2
ESTIMATED AVG GLUCOSE: 103 MG/DL (ref 68–131)
GLUCOSE SERPL-MCNC: 91 MG/DL (ref 70–110)
HBA1C MFR BLD: 5.2 % (ref 4–5.6)
POTASSIUM SERPL-SCNC: 4.7 MMOL/L (ref 3.5–5.1)
PROT SERPL-MCNC: 7.8 G/DL (ref 6–8.4)
SODIUM SERPL-SCNC: 139 MMOL/L (ref 136–145)
TSH SERPL DL<=0.005 MIU/L-ACNC: 1.78 UIU/ML (ref 0.4–4)

## 2021-03-19 ENCOUNTER — TELEPHONE (OUTPATIENT)
Dept: FAMILY MEDICINE | Facility: CLINIC | Age: 66
End: 2021-03-19

## 2021-04-05 ENCOUNTER — PATIENT MESSAGE (OUTPATIENT)
Dept: ADMINISTRATIVE | Facility: HOSPITAL | Age: 66
End: 2021-04-05

## 2021-04-07 DIAGNOSIS — Z76.0 MEDICATION REFILL: ICD-10-CM

## 2021-04-07 RX ORDER — TOLTERODINE 4 MG/1
CAPSULE, EXTENDED RELEASE ORAL
Qty: 30 CAPSULE | Refills: 0 | OUTPATIENT
Start: 2021-04-07

## 2021-04-22 DIAGNOSIS — Z76.0 MEDICATION REFILL: ICD-10-CM

## 2021-04-22 RX ORDER — TOLTERODINE 4 MG/1
4 CAPSULE, EXTENDED RELEASE ORAL DAILY
Qty: 30 CAPSULE | Refills: 0 | OUTPATIENT
Start: 2021-04-22 | End: 2022-04-22

## 2021-05-19 ENCOUNTER — TELEPHONE (OUTPATIENT)
Dept: INTERNAL MEDICINE | Facility: CLINIC | Age: 66
End: 2021-05-19

## 2021-07-01 ENCOUNTER — TELEPHONE (OUTPATIENT)
Dept: FAMILY MEDICINE | Facility: CLINIC | Age: 66
End: 2021-07-01

## 2021-07-16 ENCOUNTER — IMMUNIZATION (OUTPATIENT)
Dept: INTERNAL MEDICINE | Facility: CLINIC | Age: 66
End: 2021-07-16
Payer: COMMERCIAL

## 2021-07-16 DIAGNOSIS — Z23 NEED FOR VACCINATION: Primary | ICD-10-CM

## 2021-07-16 PROCEDURE — 91300 COVID-19, MRNA, LNP-S, PF, 30 MCG/0.3 ML DOSE VACCINE: CPT | Mod: PBBFAC | Performed by: FAMILY MEDICINE

## 2021-08-02 ENCOUNTER — OFFICE VISIT (OUTPATIENT)
Dept: INTERNAL MEDICINE | Facility: CLINIC | Age: 66
End: 2021-08-02
Payer: COMMERCIAL

## 2021-08-02 VITALS
BODY MASS INDEX: 55.32 KG/M2 | DIASTOLIC BLOOD PRESSURE: 82 MMHG | HEIGHT: 61 IN | SYSTOLIC BLOOD PRESSURE: 126 MMHG | TEMPERATURE: 98 F | OXYGEN SATURATION: 98 % | HEART RATE: 73 BPM | WEIGHT: 293 LBS

## 2021-08-02 DIAGNOSIS — Z12.11 COLON CANCER SCREENING: ICD-10-CM

## 2021-08-02 DIAGNOSIS — Z00.00 ROUTINE MEDICAL EXAM: Primary | ICD-10-CM

## 2021-08-02 DIAGNOSIS — Z76.0 MEDICATION REFILL: ICD-10-CM

## 2021-08-02 DIAGNOSIS — I10 ESSENTIAL HYPERTENSION: ICD-10-CM

## 2021-08-02 PROCEDURE — 99999 PR PBB SHADOW E&M-EST. PATIENT-LVL IV: ICD-10-PCS | Mod: PBBFAC,,, | Performed by: NURSE PRACTITIONER

## 2021-08-02 PROCEDURE — 3074F PR MOST RECENT SYSTOLIC BLOOD PRESSURE < 130 MM HG: ICD-10-PCS | Mod: CPTII,S$GLB,, | Performed by: NURSE PRACTITIONER

## 2021-08-02 PROCEDURE — 3008F BODY MASS INDEX DOCD: CPT | Mod: CPTII,S$GLB,, | Performed by: NURSE PRACTITIONER

## 2021-08-02 PROCEDURE — 1160F PR REVIEW ALL MEDS BY PRESCRIBER/CLIN PHARMACIST DOCUMENTED: ICD-10-PCS | Mod: CPTII,S$GLB,, | Performed by: NURSE PRACTITIONER

## 2021-08-02 PROCEDURE — 1159F MED LIST DOCD IN RCRD: CPT | Mod: CPTII,S$GLB,, | Performed by: NURSE PRACTITIONER

## 2021-08-02 PROCEDURE — 3079F DIAST BP 80-89 MM HG: CPT | Mod: CPTII,S$GLB,, | Performed by: NURSE PRACTITIONER

## 2021-08-02 PROCEDURE — 1125F AMNT PAIN NOTED PAIN PRSNT: CPT | Mod: CPTII,S$GLB,, | Performed by: NURSE PRACTITIONER

## 2021-08-02 PROCEDURE — 3079F PR MOST RECENT DIASTOLIC BLOOD PRESSURE 80-89 MM HG: ICD-10-PCS | Mod: CPTII,S$GLB,, | Performed by: NURSE PRACTITIONER

## 2021-08-02 PROCEDURE — 1159F PR MEDICATION LIST DOCUMENTED IN MEDICAL RECORD: ICD-10-PCS | Mod: CPTII,S$GLB,, | Performed by: NURSE PRACTITIONER

## 2021-08-02 PROCEDURE — 99999 PR PBB SHADOW E&M-EST. PATIENT-LVL IV: CPT | Mod: PBBFAC,,, | Performed by: NURSE PRACTITIONER

## 2021-08-02 PROCEDURE — 1125F PR PAIN SEVERITY QUANTIFIED, PAIN PRESENT: ICD-10-PCS | Mod: CPTII,S$GLB,, | Performed by: NURSE PRACTITIONER

## 2021-08-02 PROCEDURE — 3044F HG A1C LEVEL LT 7.0%: CPT | Mod: CPTII,S$GLB,, | Performed by: NURSE PRACTITIONER

## 2021-08-02 PROCEDURE — 3008F PR BODY MASS INDEX (BMI) DOCUMENTED: ICD-10-PCS | Mod: CPTII,S$GLB,, | Performed by: NURSE PRACTITIONER

## 2021-08-02 PROCEDURE — 3044F PR MOST RECENT HEMOGLOBIN A1C LEVEL <7.0%: ICD-10-PCS | Mod: CPTII,S$GLB,, | Performed by: NURSE PRACTITIONER

## 2021-08-02 PROCEDURE — 1160F RVW MEDS BY RX/DR IN RCRD: CPT | Mod: CPTII,S$GLB,, | Performed by: NURSE PRACTITIONER

## 2021-08-02 PROCEDURE — 99214 OFFICE O/P EST MOD 30 MIN: CPT | Mod: S$GLB,,, | Performed by: NURSE PRACTITIONER

## 2021-08-02 PROCEDURE — 3074F SYST BP LT 130 MM HG: CPT | Mod: CPTII,S$GLB,, | Performed by: NURSE PRACTITIONER

## 2021-08-02 PROCEDURE — 99214 PR OFFICE/OUTPT VISIT, EST, LEVL IV, 30-39 MIN: ICD-10-PCS | Mod: S$GLB,,, | Performed by: NURSE PRACTITIONER

## 2021-08-02 RX ORDER — TOLTERODINE 4 MG/1
CAPSULE, EXTENDED RELEASE ORAL
Qty: 30 CAPSULE | Refills: 5 | Status: SHIPPED | OUTPATIENT
Start: 2021-08-02 | End: 2022-01-24 | Stop reason: SDUPTHER

## 2021-08-06 ENCOUNTER — IMMUNIZATION (OUTPATIENT)
Dept: INTERNAL MEDICINE | Facility: CLINIC | Age: 66
End: 2021-08-06
Payer: COMMERCIAL

## 2021-08-06 DIAGNOSIS — Z23 NEED FOR VACCINATION: Primary | ICD-10-CM

## 2021-08-06 PROCEDURE — 0002A COVID-19, MRNA, LNP-S, PF, 30 MCG/0.3 ML DOSE VACCINE: ICD-10-PCS | Mod: CV19,,, | Performed by: FAMILY MEDICINE

## 2021-08-06 PROCEDURE — 0002A COVID-19, MRNA, LNP-S, PF, 30 MCG/0.3 ML DOSE VACCINE: CPT | Mod: CV19,,, | Performed by: FAMILY MEDICINE

## 2021-08-06 PROCEDURE — 91300 COVID-19, MRNA, LNP-S, PF, 30 MCG/0.3 ML DOSE VACCINE: CPT | Mod: ,,, | Performed by: FAMILY MEDICINE

## 2021-08-06 PROCEDURE — 91300 COVID-19, MRNA, LNP-S, PF, 30 MCG/0.3 ML DOSE VACCINE: ICD-10-PCS | Mod: ,,, | Performed by: FAMILY MEDICINE

## 2022-01-21 DIAGNOSIS — Z76.0 MEDICATION REFILL: ICD-10-CM

## 2022-01-21 RX ORDER — TOLTERODINE 4 MG/1
CAPSULE, EXTENDED RELEASE ORAL
Qty: 30 CAPSULE | Refills: 5 | Status: CANCELLED | OUTPATIENT
Start: 2022-01-21

## 2022-01-24 DIAGNOSIS — Z76.0 MEDICATION REFILL: ICD-10-CM

## 2022-01-24 NOTE — TELEPHONE ENCOUNTER
----- Message from Vashti Birmingham sent at 1/24/2022  8:13 AM CST -----  Regarding: refill for her tolterodine (DETROL LA) 4 MG 24 hr capsule  Contact: pt  Caller is requesting a call back regarding a refill for her tolterodine (DETROL LA) 4 MG 24 hr capsule.  Please call back at [phone] (home) .  Thanks.    [facility]pharmacy #[id number] [facility] - [place] - [address] [facility] AT [facility]  [address]  [place] [address]  Phone: [phone] Fax: [phone]

## 2022-01-25 RX ORDER — TOLTERODINE 4 MG/1
CAPSULE, EXTENDED RELEASE ORAL
Qty: 30 CAPSULE | Refills: 0 | Status: SHIPPED | OUTPATIENT
Start: 2022-01-25 | End: 2022-03-03 | Stop reason: SDUPTHER

## 2022-02-15 ENCOUNTER — PATIENT OUTREACH (OUTPATIENT)
Dept: ADMINISTRATIVE | Facility: HOSPITAL | Age: 67
End: 2022-02-15
Payer: COMMERCIAL

## 2022-02-15 NOTE — PROGRESS NOTES
Patient notified to confirm hospital follow up appointment that was scheduled for 2/16/22 with Talia Lopez. Patient states that appointment was made today by the hospital staff and she is not able to keep that appointment. Patient states she will call back to reschedule. Appointment has been canceled.

## 2022-03-03 ENCOUNTER — TELEPHONE (OUTPATIENT)
Dept: INTERNAL MEDICINE | Facility: CLINIC | Age: 67
End: 2022-03-03

## 2022-03-03 ENCOUNTER — OFFICE VISIT (OUTPATIENT)
Dept: INTERNAL MEDICINE | Facility: CLINIC | Age: 67
End: 2022-03-03
Payer: COMMERCIAL

## 2022-03-03 VITALS
DIASTOLIC BLOOD PRESSURE: 80 MMHG | BODY MASS INDEX: 54.26 KG/M2 | WEIGHT: 287.13 LBS | SYSTOLIC BLOOD PRESSURE: 126 MMHG | HEART RATE: 88 BPM | TEMPERATURE: 98 F | OXYGEN SATURATION: 95 %

## 2022-03-03 DIAGNOSIS — I10 ESSENTIAL HYPERTENSION: ICD-10-CM

## 2022-03-03 DIAGNOSIS — Z09 HOSPITAL DISCHARGE FOLLOW-UP: Primary | ICD-10-CM

## 2022-03-03 DIAGNOSIS — Z76.0 MEDICATION REFILL: ICD-10-CM

## 2022-03-03 DIAGNOSIS — Z87.440 HISTORY OF UTI: ICD-10-CM

## 2022-03-03 PROCEDURE — 99213 OFFICE O/P EST LOW 20 MIN: CPT | Mod: S$GLB,,, | Performed by: NURSE PRACTITIONER

## 2022-03-03 PROCEDURE — 1159F MED LIST DOCD IN RCRD: CPT | Mod: CPTII,S$GLB,, | Performed by: NURSE PRACTITIONER

## 2022-03-03 PROCEDURE — 1160F PR REVIEW ALL MEDS BY PRESCRIBER/CLIN PHARMACIST DOCUMENTED: ICD-10-PCS | Mod: CPTII,S$GLB,, | Performed by: NURSE PRACTITIONER

## 2022-03-03 PROCEDURE — 99999 PR PBB SHADOW E&M-EST. PATIENT-LVL IV: ICD-10-PCS | Mod: PBBFAC,,, | Performed by: NURSE PRACTITIONER

## 2022-03-03 PROCEDURE — 3074F PR MOST RECENT SYSTOLIC BLOOD PRESSURE < 130 MM HG: ICD-10-PCS | Mod: CPTII,S$GLB,, | Performed by: NURSE PRACTITIONER

## 2022-03-03 PROCEDURE — 1159F PR MEDICATION LIST DOCUMENTED IN MEDICAL RECORD: ICD-10-PCS | Mod: CPTII,S$GLB,, | Performed by: NURSE PRACTITIONER

## 2022-03-03 PROCEDURE — 99213 PR OFFICE/OUTPT VISIT, EST, LEVL III, 20-29 MIN: ICD-10-PCS | Mod: S$GLB,,, | Performed by: NURSE PRACTITIONER

## 2022-03-03 PROCEDURE — 99999 PR PBB SHADOW E&M-EST. PATIENT-LVL IV: CPT | Mod: PBBFAC,,, | Performed by: NURSE PRACTITIONER

## 2022-03-03 PROCEDURE — 3008F BODY MASS INDEX DOCD: CPT | Mod: CPTII,S$GLB,, | Performed by: NURSE PRACTITIONER

## 2022-03-03 PROCEDURE — 3079F PR MOST RECENT DIASTOLIC BLOOD PRESSURE 80-89 MM HG: ICD-10-PCS | Mod: CPTII,S$GLB,, | Performed by: NURSE PRACTITIONER

## 2022-03-03 PROCEDURE — 3074F SYST BP LT 130 MM HG: CPT | Mod: CPTII,S$GLB,, | Performed by: NURSE PRACTITIONER

## 2022-03-03 PROCEDURE — 3008F PR BODY MASS INDEX (BMI) DOCUMENTED: ICD-10-PCS | Mod: CPTII,S$GLB,, | Performed by: NURSE PRACTITIONER

## 2022-03-03 PROCEDURE — 3079F DIAST BP 80-89 MM HG: CPT | Mod: CPTII,S$GLB,, | Performed by: NURSE PRACTITIONER

## 2022-03-03 PROCEDURE — 1160F RVW MEDS BY RX/DR IN RCRD: CPT | Mod: CPTII,S$GLB,, | Performed by: NURSE PRACTITIONER

## 2022-03-03 RX ORDER — DULOXETIN HYDROCHLORIDE 60 MG/1
60 CAPSULE, DELAYED RELEASE ORAL DAILY
Qty: 30 CAPSULE | Refills: 5 | Status: SHIPPED | OUTPATIENT
Start: 2022-03-03 | End: 2023-03-08 | Stop reason: SDUPTHER

## 2022-03-03 RX ORDER — TOLTERODINE 4 MG/1
CAPSULE, EXTENDED RELEASE ORAL
Qty: 30 CAPSULE | Refills: 5 | Status: SHIPPED | OUTPATIENT
Start: 2022-03-03 | End: 2022-07-25 | Stop reason: SDUPTHER

## 2022-03-03 NOTE — PROGRESS NOTES
Subjective:       Patient ID: April Castillo is a 66 y.o. female.    Chief Complaint: Hospital Follow Up (UTI )    Patient presents for hospital follow up.  Reports getting confused and family took her to the ER.   Was diagnosed with AMS and UTI.   Was started on antibiotics and was sent home on IV antibiotics.  Has PICC line to right UE.    Reports getting PICC out tomorrow.      Review of Systems   Constitutional: Negative for chills, fatigue and fever.   Respiratory: Negative for cough and shortness of breath.    Genitourinary: Negative for dysuria and frequency.   Musculoskeletal: Positive for arthralgias and back pain. Negative for gait problem.   Psychiatric/Behavioral: Negative for agitation and confusion.         Objective:      Physical Exam  Vitals reviewed.   Constitutional:       Appearance: Normal appearance.   HENT:      Head: Normocephalic.   Cardiovascular:      Rate and Rhythm: Normal rate and regular rhythm.   Pulmonary:      Effort: Pulmonary effort is normal.      Breath sounds: Normal breath sounds.   Musculoskeletal:         General: Swelling present.      Right lower leg: Edema present.      Left lower leg: Edema present.   Neurological:      General: No focal deficit present.      Mental Status: She is alert and oriented to person, place, and time.   Psychiatric:         Mood and Affect: Mood normal.         Behavior: Behavior normal. Behavior is cooperative.         Assessment:       Problem List Items Addressed This Visit     Essential hypertension      Other Visit Diagnoses     Hospital discharge follow-up    -  Primary    Medication refill        Relevant Medications    tolterodine (DETROL LA) 4 MG 24 hr capsule    History of UTI              Plan:         Hospital discharge follow-up    Medication refill  -     tolterodine (DETROL LA) 4 MG 24 hr capsule; TAKE 1 CAPSULE BY MOUTH ONCE DAILY.  Dispense: 30 capsule; Refill: 5    History of UTI    Essential hypertension    Other orders  -      DULoxetine (CYMBALTA) 60 MG capsule; Take 1 capsule (60 mg total) by mouth once daily.  Dispense: 30 capsule; Refill: 5         Feeling better.  Planning to have PICC removed tomorrow.     Follow up as needed.     STACIE DELUNA [facility] (recent admission records)

## 2022-03-03 NOTE — TELEPHONE ENCOUNTER
----- Message from Bisi Webster sent at 3/3/2022  2:16 PM CST -----  Contact: self [phone]  Patient states she will be 10-15mins late for appt, please call her at [phone]. Thanks ah

## 2022-03-17 ENCOUNTER — DOCUMENT SCAN (OUTPATIENT)
Dept: HOME HEALTH SERVICES | Facility: HOSPITAL | Age: 67
End: 2022-03-17
Payer: COMMERCIAL

## 2022-07-25 DIAGNOSIS — Z76.0 MEDICATION REFILL: ICD-10-CM

## 2022-07-26 RX ORDER — TOLTERODINE 4 MG/1
CAPSULE, EXTENDED RELEASE ORAL
Qty: 30 CAPSULE | Refills: 0 | Status: SHIPPED | OUTPATIENT
Start: 2022-07-26 | End: 2022-08-29 | Stop reason: SDUPTHER

## 2022-08-29 DIAGNOSIS — Z76.0 MEDICATION REFILL: ICD-10-CM

## 2022-08-29 RX ORDER — TOLTERODINE 4 MG/1
CAPSULE, EXTENDED RELEASE ORAL
Qty: 30 CAPSULE | Refills: 0 | Status: SHIPPED | OUTPATIENT
Start: 2022-08-29 | End: 2022-10-11 | Stop reason: SDUPTHER

## 2022-08-29 NOTE — TELEPHONE ENCOUNTER
----- Message from Halle Peck sent at 8/29/2022  7:53 AM CDT -----  Contact: [phone] Patient  Requesting an RX refill or new RX.  Is this a refill or new RX: new  RX name and strength (copy/paste from chart):  tolterodine (DETROL LA) 4 MG 24 hr capsule  Is this a 30 day or 90 day RX:   Pharmacy name and phone # (copy/paste from chart):      [id number] - [facility] [address] [facility]  [address] [address]  Phone: [phone] Fax: [phone]    The doctors have asked that we provide their patients with the following 2 reminders -- prescription refills can take up to 72 hours, and a friendly reminder that in the future you can use your [facility] account to request refills: yes

## 2022-09-15 DIAGNOSIS — Z78.0 MENOPAUSE: ICD-10-CM

## 2022-09-15 DIAGNOSIS — Z12.31 OTHER SCREENING MAMMOGRAM: ICD-10-CM

## 2022-09-27 DIAGNOSIS — I10 ESSENTIAL HYPERTENSION: ICD-10-CM

## 2022-10-04 ENCOUNTER — PATIENT MESSAGE (OUTPATIENT)
Dept: ADMINISTRATIVE | Facility: HOSPITAL | Age: 67
End: 2022-10-04
Payer: MEDICARE

## 2022-10-07 ENCOUNTER — TELEPHONE (OUTPATIENT)
Dept: FAMILY MEDICINE | Facility: CLINIC | Age: 67
End: 2022-10-07
Payer: MEDICARE

## 2022-10-07 DIAGNOSIS — Z76.0 MEDICATION REFILL: ICD-10-CM

## 2022-10-07 NOTE — TELEPHONE ENCOUNTER
----- Message from Rochelle Lundy sent at 10/7/2022  2:25 PM CDT -----  Type:  RX Refill Request  Who Called: PATIENT  Refill or New Rx:refill  RX Name and Strength:tolterodine  How is the patient currently taking it? (ex. 1XDay):na  Is this a 30 day or 90 day RX:na  Preferred Pharmacy with phone number:[facility]  Local or Mail Order:local  Ordering Provider: CINDY Lopez  Would the patient rather a call back or a response via [facility]? Call back  Best Call Back Number:[phone]  Additional Information: pt call earlier this wk, checking to see if [facility]

## 2022-10-11 RX ORDER — TOLTERODINE 4 MG/1
CAPSULE, EXTENDED RELEASE ORAL
Qty: 30 CAPSULE | Refills: 2 | Status: SHIPPED | OUTPATIENT
Start: 2022-10-11 | End: 2023-01-11 | Stop reason: SDUPTHER

## 2022-11-11 ENCOUNTER — OFFICE VISIT (OUTPATIENT)
Dept: INTERNAL MEDICINE | Facility: CLINIC | Age: 67
End: 2022-11-11
Payer: MEDICARE

## 2022-11-11 VITALS
HEIGHT: 61 IN | DIASTOLIC BLOOD PRESSURE: 82 MMHG | HEART RATE: 97 BPM | SYSTOLIC BLOOD PRESSURE: 124 MMHG | TEMPERATURE: 98 F | RESPIRATION RATE: 18 BRPM | OXYGEN SATURATION: 99 % | WEIGHT: 270.06 LBS | BODY MASS INDEX: 50.99 KG/M2

## 2022-11-11 DIAGNOSIS — N39.0 URINARY TRACT INFECTION WITH HEMATURIA, SITE UNSPECIFIED: Primary | ICD-10-CM

## 2022-11-11 DIAGNOSIS — R31.9 URINARY TRACT INFECTION WITH HEMATURIA, SITE UNSPECIFIED: Primary | ICD-10-CM

## 2022-11-11 LAB
BILIRUB SERPL-MCNC: ABNORMAL MG/DL
BLOOD URINE, POC: 250
CLARITY UR: ABNORMAL
COLOR, POC UA: ABNORMAL
GLUCOSE UR QL STRIP: ABNORMAL
KETONES UR QL STRIP: ABNORMAL
LEUKOCYTE ESTERASE URINE, POC: ABNORMAL
NITRITE, POC UA: ABNORMAL
PH, POC UA: 6
PROTEIN, POC: ABNORMAL
SPECIFIC GRAVITY, POC UA: 1.01
UROBILINOGEN, POC UA: ABNORMAL

## 2022-11-11 PROCEDURE — 87088 URINE BACTERIA CULTURE: CPT | Performed by: NURSE PRACTITIONER

## 2022-11-11 PROCEDURE — 99999 PR PBB SHADOW E&M-EST. PATIENT-LVL V: CPT | Mod: PBBFAC,,, | Performed by: NURSE PRACTITIONER

## 2022-11-11 PROCEDURE — 99999 PR PBB SHADOW E&M-EST. PATIENT-LVL V: ICD-10-PCS | Mod: PBBFAC,,, | Performed by: NURSE PRACTITIONER

## 2022-11-11 PROCEDURE — 87077 CULTURE AEROBIC IDENTIFY: CPT | Performed by: NURSE PRACTITIONER

## 2022-11-11 PROCEDURE — 81001 URINALYSIS AUTO W/SCOPE: CPT | Mod: PBBFAC,PN | Performed by: NURSE PRACTITIONER

## 2022-11-11 PROCEDURE — 99213 OFFICE O/P EST LOW 20 MIN: CPT | Mod: S$PBB,,, | Performed by: NURSE PRACTITIONER

## 2022-11-11 PROCEDURE — 99215 OFFICE O/P EST HI 40 MIN: CPT | Mod: PBBFAC,PN | Performed by: NURSE PRACTITIONER

## 2022-11-11 PROCEDURE — 99213 PR OFFICE/OUTPT VISIT, EST, LEVL III, 20-29 MIN: ICD-10-PCS | Mod: S$PBB,,, | Performed by: NURSE PRACTITIONER

## 2022-11-11 PROCEDURE — 87086 URINE CULTURE/COLONY COUNT: CPT | Performed by: NURSE PRACTITIONER

## 2022-11-11 PROCEDURE — 87186 SC STD MICRODIL/AGAR DIL: CPT | Performed by: NURSE PRACTITIONER

## 2022-11-11 RX ORDER — SULFAMETHOXAZOLE AND TRIMETHOPRIM 800; 160 MG/1; MG/1
1 TABLET ORAL 2 TIMES DAILY
Qty: 14 TABLET | Refills: 0 | Status: SHIPPED | OUTPATIENT
Start: 2022-11-11 | End: 2022-11-14

## 2022-11-11 RX ORDER — BUMETANIDE 2 MG/1
2 TABLET ORAL 2 TIMES DAILY
COMMUNITY
Start: 2022-08-30

## 2022-11-11 NOTE — PROGRESS NOTES
CC:Dysuria.  April Castillo is a 67 y.o. female with a new complaint of  left lower back pain .   The patient denies dysuria and urinary frequency, fever, chills  Symptoms have been present for 3 day(s).   Treatments tried include: increased water intake and this has not helped the symptoms.    Review of patient's allergies indicates:  No Known Allergies    Outpatient Medications Prior to Visit   Medication Sig Dispense Refill    cholecalciferol, vitamin D3, 125 mcg (5,000 unit) Tab Take 5,000 Units by mouth once daily.      DULoxetine (CYMBALTA) 60 MG capsule Take 1 capsule (60 mg total) by mouth once daily. 30 capsule 5    ELIQUIS 5 mg Tab TAKE 2 TABLETS BY MOUTH TWICE DAILY UNTIL 12/11 THEN TAKE 1 TABLET TWICE DAILY FOR 6 MONTHS 60 tablet 0    furosemide (LASIX) 40 MG tablet Take 40 mg by mouth 2 (two) times daily.  11    metoprolol tartrate (LOPRESSOR) 25 MG tablet Take 25 mg by mouth 2 (two) times daily.  3    potassium chloride SA (K-DUR,KLOR-CON) 20 MEQ tablet Take 1 tablet (20 mEq total) by mouth once daily. 30 tablet 5    tolterodine (DETROL LA) 4 MG 24 hr capsule TAKE 1 CAPSULE BY MOUTH ONCE DAILY. 30 capsule 2    tramadol (ULTRAM) 50 mg tablet Take 50 mg by mouth 4 (four) times daily.  2    TURMERIC-TURMERIC ROOT EXTRACT ORAL Take by mouth.      walker (ULTRA-LIGHT ROLLATOR) Misc DX:  M48.00; G89.29 1 each 0    zonisamide (ZONEGRAN) 100 MG Cap Take by mouth. 4 Capsule Oral Every evening      b complex vitamins tablet Take 1 tablet by mouth once daily.      bumetanide (BUMEX) 2 MG tablet Take 2 mg by mouth 2 (two) times daily.      FOLIC ACID/MULTIVIT-MIN/LUTEIN (CENTRUM SILVER ORAL) Take by mouth.      hydrocodone-acetaminophen 10-325mg (NORCO)  mg Tab Take by mouth. 1 Tablet Oral Four times a day       No facility-administered medications prior to visit.        Physical Exam   /82 (BP Location: Left arm, Patient Position: Sitting, BP Method: Large (Manual))   Pulse 97   Temp 98.4 °F (36.9 °C)  "(Temporal)   Resp 18   Ht 5' 1" (1.549 m)   Wt 122.5 kg (270 lb 1 oz)   SpO2 99%   BMI 51.03 kg/m²   Constitutional: The patient appears well-developed and well-nourished. No distress.   Cardiovascular: Normal rate, regular rhythm and normal heart sounds.  Exam reveals no gallop and no friction rub.    No murmur heard.  Pulmonary/Chest: Effort normal and breath sounds normal. No respiratory distress. She has no wheezes. She has no rales.   Abdominal: Soft. Bowel sounds are normal. The patient exhibits no distension and no mass. There is tenderness in the suprapubic area. There is no rebound, no guarding and no CVA tenderness. No hernia.   Skin: The patient is not diaphoretic.     The patient had a urinalysis performed today and it was abnormal.     Encounter Diagnosis   Name Primary?    Urinary tract infection with hematuria, site unspecified Yes       PLAN:  Diagnoses and all orders for this visit:    Urinary tract infection with hematuria, site unspecified  -     POCT URINE DIPSTICK WITH MICROSCOPE, AUTOMATED  -     Urine culture  -     sulfamethoxazole-trimethoprim 800-160mg (BACTRIM DS) 800-160 mg Tab; Take 1 tablet by mouth 2 (two) times daily. for 7 days    Medications Ordered This Encounter   Medications    sulfamethoxazole-trimethoprim 800-160mg (BACTRIM DS) 800-160 mg Tab     Sig: Take 1 tablet by mouth 2 (two) times daily. for 7 days     Dispense:  14 tablet     Refill:  0     [unfilled]  Orders Placed This Encounter   Procedures    Urine culture    POCT URINE DIPSTICK WITH MICROSCOPE, AUTOMATED     RTC if symptoms are worsening or changing significantly or if not improved by the end of therapy.      "

## 2022-11-14 DIAGNOSIS — N39.0 E. COLI UTI: Primary | ICD-10-CM

## 2022-11-14 DIAGNOSIS — B96.20 E. COLI UTI: Primary | ICD-10-CM

## 2022-11-14 LAB — BACTERIA UR CULT: ABNORMAL

## 2022-11-14 RX ORDER — CIPROFLOXACIN 500 MG/1
500 TABLET ORAL 2 TIMES DAILY
Qty: 10 TABLET | Refills: 0 | Status: SHIPPED | OUTPATIENT
Start: 2022-11-14 | End: 2022-11-19

## 2023-01-11 DIAGNOSIS — Z76.0 MEDICATION REFILL: ICD-10-CM

## 2023-01-12 NOTE — TELEPHONE ENCOUNTER
----- Message from Bina Luque sent at 1/12/2023  2:00 PM CST -----  Contact: April  Type:  RX Refill Request    Who Called: April  Refill or New Rx:refill  RX Name and Strength:tolterodine (DETROL LA) 4 MG 24 hr capsule  How is the patient currently taking it? (ex. 1XDay):1 time a day  Is this a 30 day or 90 day RX:30day  Preferred Pharmacy with phone number:  [id number] [address]  [facility]  [address] [address]  Phone: [phone] Fax: [phone]  Local or Mail Order:local  Ordering Provider:Talia Lopez  Would the patient rather a call back or a response via [facility]? Call back  Best Call Back Number:[phone]  Additional Information: patient stated she would [facility]  LJ

## 2023-01-13 ENCOUNTER — PATIENT MESSAGE (OUTPATIENT)
Dept: INTERNAL MEDICINE | Facility: CLINIC | Age: 68
End: 2023-01-13
Payer: COMMERCIAL

## 2023-01-13 RX ORDER — TOLTERODINE 4 MG/1
CAPSULE, EXTENDED RELEASE ORAL
Qty: 30 CAPSULE | Refills: 0 | Status: SHIPPED | OUTPATIENT
Start: 2023-01-13 | End: 2023-02-21 | Stop reason: SDUPTHER

## 2023-02-03 ENCOUNTER — PATIENT OUTREACH (OUTPATIENT)
Dept: ADMINISTRATIVE | Facility: HOSPITAL | Age: 68
End: 2023-02-03
Payer: COMMERCIAL

## 2023-02-03 NOTE — PROGRESS NOTES
Working the mammogram report Called patient to discuss scheduling a mammogram Unable to leave a message

## 2023-02-15 ENCOUNTER — TELEPHONE (OUTPATIENT)
Dept: FAMILY MEDICINE | Facility: CLINIC | Age: 68
End: 2023-02-15
Payer: COMMERCIAL

## 2023-02-15 NOTE — TELEPHONE ENCOUNTER
----- Message from Ryan Gonzales sent at 2/15/2023  3:56 PM CST -----  Pt is calling for refill     Meds  tolterodine (DETROL LA) 4 MG 24 hr capsule      [facility]/pharmacy #[id number]  - [place] - [address] AT AT [facility]  [address]  [place] [address]  Phone: [phone] Fax: [phone]        the patient contact    [phone]

## 2023-02-21 DIAGNOSIS — Z76.0 MEDICATION REFILL: ICD-10-CM

## 2023-02-21 RX ORDER — TOLTERODINE 4 MG/1
CAPSULE, EXTENDED RELEASE ORAL
Qty: 30 CAPSULE | Refills: 2 | Status: SHIPPED | OUTPATIENT
Start: 2023-02-21 | End: 2023-04-26 | Stop reason: ALTCHOICE

## 2023-02-21 NOTE — TELEPHONE ENCOUNTER
----- Message from Katharine Omi sent at 2/21/2023  9:55 AM CST -----  .Type:  RX Refill Request    Who Called: Edita Castillo   Refill or New Rx:refill  RX Name and Strength:tolterodine (DETROL LA) 4 MG 24 hr capsule  How is the patient currently taking it? (ex. 1XDay):daily  Is this a 30 day or 90 day RX:90    Preferred Pharmacy with phone number: .  [facility]/pharmacy #[id number] [facility] - [place] - [address] [facility] AT AT [facility]  [address] [facility] [address]  Phone: [phone] Fax: [phone]    Local or Mail Order:local  Ordering Provider:Talia Lopez  Would the patient rather a call back or a response via [facility]? Call back  Best Call Back Number:.[phone]   Additional Information:

## 2023-02-21 NOTE — TELEPHONE ENCOUNTER
S/w pt, states she will call back to setup an establish care appt with AKI Melgar as she no longer sees Dr. Casas.       ----- Message from Katharine Braga sent at 2/21/2023  9:55 AM CST -----  .Type:  RX Refill Request    Who Called: Edita Castillo   Refill or New Rx:refill  RX Name and Strength:tolterodine (DETROL LA) 4 MG 24 hr capsule  How is the patient currently taking it? (ex. 1XDay):daily  Is this a 30 day or 90 day RX:90    Preferred Pharmacy with phone number: .  [facility] #[id number] [facility] - [place], [place] - [address] AT AT [facility]  [address] [facility] [address]  Phone: [phone] Fax: [phone]    Local or Mail Order:local  Ordering Provider:Talia Lopez  Would the patient rather a call back or a response via [employer]? Call back  Best Call Back Number:.[phone]   Additional Information:

## 2023-03-06 ENCOUNTER — TELEPHONE (OUTPATIENT)
Dept: INTERNAL MEDICINE | Facility: CLINIC | Age: 68
End: 2023-03-06
Payer: COMMERCIAL

## 2023-03-06 NOTE — TELEPHONE ENCOUNTER
Please review and advise regarding refill of her cymbalta as Talia PRABHAKAR forwarded the request already. Apt scheduled for 04/26/23 with you. //kah

## 2023-03-06 NOTE — TELEPHONE ENCOUNTER
----- Message from Americo Mosley sent at 3/6/2023 12:36 PM CST -----      Name of Who is Calling:PT          What is the request in detail:PT is requesting a call back to discuss the pharmacy is saying they do not have a prescription for her medication.DULoxetine (CYMBALTA) 60 MG capsule 30 capsule 5 3/3/2022  No  Sig - Route: Take 1 capsule (60 mg total) by mouth once daily. - Oral                Can the clinic reply by [facility]:no          What Number to Call Back if not in [phone]

## 2023-03-06 NOTE — TELEPHONE ENCOUNTER
----- Message from Melissa Thomas sent at 3/6/2023  3:45 PM CST -----  Contact: April  Type:  RX Refill Request    Who Called: April  Refill or New Rx:refill  RX Name and Strength:DULoxetine (CYMBALTA) 60 MG capsule  How is the patient currently taking it? (ex. 1XDay):as prescribed  Is this a 30 day or 90 day RX:30 refill  Preferred Pharmacy with phone number:  [facility]/pharmacy #[id number] [facility] - [place] - [address] [facility] AT AT [facility]  [address] [facility] [address]  Phone: [phone] Fax: [phone]  Local or Mail Order:local  Ordering Provider:John  Would the patient rather a call back or a response via [facility]? Call back  Best Call Back Number:[phone]  Additional Information: n/a    Thanks  TS

## 2023-03-06 NOTE — TELEPHONE ENCOUNTER
I called the pt back and the message said the wireless caller that you're trying to reach is not available. //kah

## 2023-03-08 ENCOUNTER — OFFICE VISIT (OUTPATIENT)
Dept: FAMILY MEDICINE | Facility: CLINIC | Age: 68
End: 2023-03-08
Payer: MEDICARE

## 2023-03-08 VITALS
TEMPERATURE: 98 F | BODY MASS INDEX: 51.21 KG/M2 | WEIGHT: 271.25 LBS | HEIGHT: 61 IN | SYSTOLIC BLOOD PRESSURE: 118 MMHG | HEART RATE: 93 BPM | OXYGEN SATURATION: 98 % | DIASTOLIC BLOOD PRESSURE: 76 MMHG

## 2023-03-08 DIAGNOSIS — M54.9 BACK PAIN, UNSPECIFIED BACK LOCATION, UNSPECIFIED BACK PAIN LATERALITY, UNSPECIFIED CHRONICITY: Primary | ICD-10-CM

## 2023-03-08 DIAGNOSIS — Z76.0 MEDICATION REFILL: ICD-10-CM

## 2023-03-08 PROCEDURE — 99999 PR PBB SHADOW E&M-EST. PATIENT-LVL III: CPT | Mod: PBBFAC,,, | Performed by: REGISTERED NURSE

## 2023-03-08 PROCEDURE — 99214 OFFICE O/P EST MOD 30 MIN: CPT | Mod: S$PBB,,, | Performed by: REGISTERED NURSE

## 2023-03-08 PROCEDURE — 99213 OFFICE O/P EST LOW 20 MIN: CPT | Mod: PBBFAC,PO | Performed by: REGISTERED NURSE

## 2023-03-08 PROCEDURE — 81001 URINALYSIS AUTO W/SCOPE: CPT | Performed by: REGISTERED NURSE

## 2023-03-08 PROCEDURE — 99214 PR OFFICE/OUTPT VISIT, EST, LEVL IV, 30-39 MIN: ICD-10-PCS | Mod: S$PBB,,, | Performed by: REGISTERED NURSE

## 2023-03-08 PROCEDURE — 99999 PR PBB SHADOW E&M-EST. PATIENT-LVL III: ICD-10-PCS | Mod: PBBFAC,,, | Performed by: REGISTERED NURSE

## 2023-03-08 RX ORDER — DULOXETIN HYDROCHLORIDE 60 MG/1
60 CAPSULE, DELAYED RELEASE ORAL DAILY
Qty: 30 CAPSULE | Refills: 5 | Status: SHIPPED | OUTPATIENT
Start: 2023-03-08 | End: 2023-06-13

## 2023-03-08 RX ORDER — POTASSIUM CHLORIDE 750 MG/1
10 TABLET, FILM COATED, EXTENDED RELEASE ORAL DAILY
COMMUNITY
Start: 2022-12-28

## 2023-03-08 RX ORDER — TOPIRAMATE 25 MG/1
25 TABLET ORAL 2 TIMES DAILY
COMMUNITY
Start: 2022-12-05

## 2023-03-08 NOTE — PROGRESS NOTES
"Subjective:      April Castillo is a 67 y.o. female, here today with C/C of:  Medication Refill      HPI    Mrs. Castillo is here for a medication refill on Cymbalta.  Last annual done per PCP in 2016.  Has been out of her medication for past few days, no issues but "has felt off".      Review of Systems   Constitutional:  Positive for activity change and fatigue. Negative for appetite change, chills, diaphoresis and fever.   Respiratory: Negative.     Cardiovascular:  Positive for leg swelling (at times). Negative for chest pain and palpitations.   Gastrointestinal: Negative.    Genitourinary:  Positive for frequency. Negative for dysuria, flank pain, hematuria, pelvic pain and urgency.   Musculoskeletal:  Positive for back pain (with urinary frequency, req UA) and gait problem (using walker).   Skin:  Negative for rash and wound.   Neurological:  Positive for weakness. Negative for tremors, syncope, light-headedness and headaches.       Review of patient's allergies indicates:  No Known Allergies    Patient Active Problem List   Diagnosis    Spinal stenosis    Gout with manifestations    Chronic pain    Bilateral pulmonary embolism    Essential hypertension    Morbid obesity with BMI of 50.0-59.9, adult         Current Outpatient Medications:     b complex vitamins tablet, Take 1 tablet by mouth once daily., Disp: , Rfl:     bumetanide (BUMEX) 2 MG tablet, Take 2 mg by mouth 2 (two) times daily., Disp: , Rfl:     cholecalciferol, vitamin D3, 125 mcg (5,000 unit) Tab, Take 5,000 Units by mouth once daily., Disp: , Rfl:     DULoxetine (CYMBALTA) 60 MG capsule, Take 1 capsule (60 mg total) by mouth once daily., Disp: 30 capsule, Rfl: 5    ELIQUIS 5 mg Tab, TAKE 2 TABLETS BY MOUTH TWICE DAILY UNTIL 12/11 THEN TAKE 1 TABLET TWICE DAILY FOR 6 MONTHS, Disp: 60 tablet, Rfl: 0    FOLIC ACID/MULTIVIT-MIN/LUTEIN (CENTRUM SILVER ORAL), Take by mouth., Disp: , Rfl:     furosemide (LASIX) 40 MG tablet, Take 40 mg by mouth 2 (two) " "times daily., Disp: , Rfl: 11    metoprolol tartrate (LOPRESSOR) 25 MG tablet, Take 25 mg by mouth 2 (two) times daily., Disp: , Rfl: 3    potassium chloride SA (K-DUR,KLOR-CON) 20 MEQ tablet, Take 1 tablet (20 mEq total) by mouth once daily., Disp: 30 tablet, Rfl: 5    tolterodine (DETROL LA) 4 MG 24 hr capsule, TAKE 1 CAPSULE BY MOUTH ONCE DAILY., Disp: 30 capsule, Rfl: 2    tramadol (ULTRAM) 50 mg tablet, Take 50 mg by mouth 4 (four) times daily., Disp: , Rfl: 2    TURMERIC-TURMERIC ROOT EXTRACT ORAL, Take by mouth., Disp: , Rfl:     walker (ULTRA-LIGHT ROLLATOR) Misc, DX:  M48.00; G89.29, Disp: 1 each, Rfl: 0    zonisamide (ZONEGRAN) 100 MG Cap, Take by mouth. 4 Capsule Oral Every evening, Disp: , Rfl:       Past medical, surgical, family and social histories have been reviewed today.      Objective:     Vitals:    03/08/23 1318   BP: 118/76   Pulse: 93   Temp: 98.4 °F (36.9 °C)   SpO2: 98%   Weight: 123.1 kg (271 lb 4.4 oz)   Height: 5' 1" (1.549 m)   PainSc: 0-No pain       Physical Exam  Vitals reviewed.   Constitutional:       General: She is not in acute distress.  Eyes:      Pupils: Pupils are equal, round, and reactive to light.   Cardiovascular:      Rate and Rhythm: Normal rate and regular rhythm.      Pulses: Normal pulses.      Heart sounds: Normal heart sounds.   Pulmonary:      Effort: Pulmonary effort is normal.      Breath sounds: Normal breath sounds.   Musculoskeletal:         General: Normal range of motion.      Cervical back: Normal range of motion and neck supple. No rigidity.      Right lower leg: No edema.      Left lower leg: No edema.   Skin:     Capillary Refill: Capillary refill takes less than 2 seconds.   Neurological:      Mental Status: She is alert and oriented to person, place, and time. Mental status is at baseline.      Gait: Gait abnormal (using walker).   Psychiatric:         Mood and Affect: Mood normal.         Behavior: Behavior normal.         Thought Content: Thought " content normal.         Judgment: Judgment normal.       Diagnosis/Assessment:     1. Back pain, unspecified back location, unspecified back pain laterality, unspecified chronicity  - Urinalysis, Reflex to Urine Culture Urine, Clean Catch; Future    2. Medication refill  - DULoxetine (CYMBALTA) 60 MG capsule; Take 1 capsule (60 mg total) by mouth once daily.  Dispense: 30 capsule; Refill: 5       Plan:     See Dr. Casas on 4/26/23 as scheduled.    Follow-up:     Pending UA.  RTC as directed or on prn basis.        ZAC Khan  [facility]       30 minutes of total time spent on the encounter, which includes face to face time and non-face to face time preparing to see the patient.  This included obtaining and/or reviewing separately obtained history, and documenting clinical information in the electronic or other health record.   Also includes independent interpretation of results (not separately reported) and communicating results to the patient/family/caregiver, with care coordination (not separately reported).

## 2023-03-09 LAB
BACTERIA #/AREA URNS AUTO: ABNORMAL /HPF
BILIRUB UR QL STRIP: NEGATIVE
CLARITY UR REFRACT.AUTO: CLEAR
COLOR UR AUTO: YELLOW
GLUCOSE UR QL STRIP: NEGATIVE
HGB UR QL STRIP: ABNORMAL
KETONES UR QL STRIP: NEGATIVE
LEUKOCYTE ESTERASE UR QL STRIP: NEGATIVE
MICROSCOPIC COMMENT: ABNORMAL
NITRITE UR QL STRIP: NEGATIVE
PH UR STRIP: 7 [PH] (ref 5–8)
PROT UR QL STRIP: NEGATIVE
RBC #/AREA URNS AUTO: 24 /HPF (ref 0–4)
SP GR UR STRIP: 1.01 (ref 1–1.03)
SQUAMOUS #/AREA URNS AUTO: 4 /HPF
URN SPEC COLLECT METH UR: ABNORMAL
WBC #/AREA URNS AUTO: 0 /HPF (ref 0–5)

## 2023-03-13 ENCOUNTER — TELEPHONE (OUTPATIENT)
Dept: FAMILY MEDICINE | Facility: CLINIC | Age: 68
End: 2023-03-13
Payer: COMMERCIAL

## 2023-03-13 NOTE — TELEPHONE ENCOUNTER
----- Message from Katharine Braga sent at 3/13/2023  3:07 PM CDT -----  .Type:  Test Results    Who Called: Edita Castillo   Name of Test (Lab/Mammo/Etc): urinalysis  Date of Test: 03/08/2023  Ordering Provider: Iván  Where the test was performed: [facility]  Would the patient rather a call back or a response via [facility]? Call back  Best Call Back Number: .[phone]   Additional Information:  Please after summary in the mail, pt didn't receive one she stated.

## 2023-04-19 ENCOUNTER — PATIENT MESSAGE (OUTPATIENT)
Dept: ADMINISTRATIVE | Facility: HOSPITAL | Age: 68
End: 2023-04-19
Payer: COMMERCIAL

## 2023-04-25 ENCOUNTER — TELEPHONE (OUTPATIENT)
Dept: FAMILY MEDICINE | Facility: CLINIC | Age: 68
End: 2023-04-25
Payer: COMMERCIAL

## 2023-04-25 NOTE — TELEPHONE ENCOUNTER
MED-tolterodine ( DETROL LA ) 4 mg 24 hr capsule    LF- unknown    LV-10/3/16    LAV-10/3/16    UA-4/26/23

## 2023-04-26 ENCOUNTER — OFFICE VISIT (OUTPATIENT)
Dept: FAMILY MEDICINE | Facility: CLINIC | Age: 68
End: 2023-04-26
Payer: COMMERCIAL

## 2023-04-26 VITALS
TEMPERATURE: 98 F | BODY MASS INDEX: 51.61 KG/M2 | RESPIRATION RATE: 18 BRPM | HEART RATE: 82 BPM | SYSTOLIC BLOOD PRESSURE: 110 MMHG | HEIGHT: 61 IN | WEIGHT: 273.38 LBS | OXYGEN SATURATION: 98 % | DIASTOLIC BLOOD PRESSURE: 80 MMHG

## 2023-04-26 DIAGNOSIS — N32.81 OAB (OVERACTIVE BLADDER): ICD-10-CM

## 2023-04-26 DIAGNOSIS — Z78.0 POSTMENOPAUSAL: ICD-10-CM

## 2023-04-26 DIAGNOSIS — I11.0 HYPERTENSIVE HEART DISEASE WITH HEART FAILURE: ICD-10-CM

## 2023-04-26 DIAGNOSIS — I10 ESSENTIAL HYPERTENSION: Primary | ICD-10-CM

## 2023-04-26 DIAGNOSIS — E66.01 MORBID OBESITY WITH BMI OF 50.0-59.9, ADULT: ICD-10-CM

## 2023-04-26 DIAGNOSIS — Z12.11 COLON CANCER SCREENING: ICD-10-CM

## 2023-04-26 DIAGNOSIS — I26.99 BILATERAL PULMONARY EMBOLISM: ICD-10-CM

## 2023-04-26 DIAGNOSIS — M48.061 SPINAL STENOSIS OF LUMBAR REGION, UNSPECIFIED WHETHER NEUROGENIC CLAUDICATION PRESENT: ICD-10-CM

## 2023-04-26 DIAGNOSIS — Z12.31 OTHER SCREENING MAMMOGRAM: ICD-10-CM

## 2023-04-26 PROCEDURE — 99999 PR PBB SHADOW E&M-EST. PATIENT-LVL V: ICD-10-PCS | Mod: PBBFAC,,, | Performed by: FAMILY MEDICINE

## 2023-04-26 PROCEDURE — 99215 PR OFFICE/OUTPT VISIT, EST, LEVL V, 40-54 MIN: ICD-10-PCS | Mod: S$GLB,,, | Performed by: FAMILY MEDICINE

## 2023-04-26 PROCEDURE — 99215 OFFICE O/P EST HI 40 MIN: CPT | Mod: PBBFAC,PO | Performed by: FAMILY MEDICINE

## 2023-04-26 PROCEDURE — 99215 OFFICE O/P EST HI 40 MIN: CPT | Mod: S$GLB,,, | Performed by: FAMILY MEDICINE

## 2023-04-26 PROCEDURE — 99999 PR PBB SHADOW E&M-EST. PATIENT-LVL V: CPT | Mod: PBBFAC,,, | Performed by: FAMILY MEDICINE

## 2023-04-26 RX ORDER — VIBEGRON 75 MG/1
75 TABLET, FILM COATED ORAL DAILY
Qty: 90 TABLET | Refills: 0 | Status: SHIPPED | OUTPATIENT
Start: 2023-04-26

## 2023-04-26 NOTE — PROGRESS NOTES
HISTORY OF PRESENT ILLNESS: Ms. Castillo comes in today for annual wellness examination. She is not fasting and has taken medications today. Her granddaughter Fabiola accompanies her today.    END OF LIFE DECISION: She does not have a living will but desires life support.    Current Outpatient Medications   Medication Sig    bumetanide (BUMEX) 2 MG tablet Take 2 mg by mouth 2 (two) times daily.    cholecalciferol, vitamin D3, 125 mcg (5,000 unit) Tab Take 5,000 Units by mouth once daily.    DULoxetine (CYMBALTA) 60 MG capsule Take 1 capsule (60 mg total) by mouth once daily.    ELIQUIS 5 mg Tab TAKE 2 TABLETS BY MOUTH TWICE DAILY UNTIL 12/11 THEN TAKE 1 TABLET TWICE DAILY FOR 6 MONTHS    FOLIC ACID/MULTIVIT-MIN/LUTEIN (CENTRUM SILVER ORAL) Take by mouth.    metoprolol tartrate (LOPRESSOR) 25 MG tablet Take 25 mg by mouth 2 (two) times daily.    tolterodine (DETROL LA) 4 MG 24 hr capsule TAKE 1 CAPSULE BY MOUTH ONCE DAILY.    topiramate (TOPAMAX) 25 MG tablet Take 25 mg by mouth 2 (two) times daily.    tramadol (ULTRAM) 50 mg tablet Take 50 mg by mouth 4 (four) times daily.    TURMERIC-TURMERIC ROOT EXTRACT ORAL Take by mouth.    walker (ULTRA-LIGHT ROLLATOR) Misc DX:  M48.00; G89.29    zonisamide (ZONEGRAN) 100 MG Cap Take by mouth. 4 Capsule Oral Every evening    b complex vitamins tablet Take 1 tablet by mouth once daily.    KLOR-CON 10 10 mEq TbSR Take 10 mEq by mouth once daily.    potassium chloride SA (K-DUR,KLOR-CON) 20 MEQ tablet Take 1 tablet (20 mEq total) by mouth once daily.            SCREENINGS:       Cholesterol: September 1, 2016.     FFS/Colonoscopy: Never. She desires.     Mammogram:  June 4, 2013 - okay.     GYN Exam: 2013 per patient. She states she will see GYN.     Dexa Scan:  Never.     Eye Exam: Scheduled for today.      PPD: Never.     HIVAb: In the past. She declines.     Immunizations:  Td/Tdap - < 10 years ago per patient.                              Gardasil - N./A.                               Zostavax - N./A.                                               Reports history of varicella or zoster. Advisdd                               Prevnar - Never. She declines.                              Pneumovax - Never.                              Seasonal Flu - Never. She declines.                           ROS:  GENERAL: Denies fever, chills, fatigue or unusual weight change. Appetite normal. Does not exercise due to back. Monitors diet.  SKIN: Denies rashes, itching, changes in mole, color or texture of skin or easy bruising.  HEAD:  Denies headaches or recent head trauma.  EYES: Denies eye pain, diplopia, redness or discharge.  Reports has visual problems.  EARS: Denies ear pain, discharge, vertigo or decreased hearing.  NOSE: Denies loss of smell, epistaxis or rhinitis.  MOUTH & THROAT: Denies hoarseness or change in voice. Denies excessive gum bleeding or mouth sores.  Denies sore throat.  NODES: Denies swollen glands.  CHEST: Denies DELGADO, wheezing, cough, or sputum production except sometimes has shortness of breath.  CARDIOVASCULAR: Denies chest pain, PND, orthopnea or reduced exercise tolerance.  Denies palpitations. Follows with Dr. Simone Luu, cardiologist, with last visit a few months ago.  ABDOMEN: Denies diarrhea, constipation, nausea, vomiting, abdominal pain, or blood in stool.  URINARY: Denies flank pain, dysuria or hematuria. Asks about Gemtesa for OAB as reports Detrol LA not works well.  GENITOURINARY: Denies flank pain, dysuria, frequency or hematuria. Performs monthly breast examination.   PERIPHERAL VASCULAR:Denies claudication or cyanosis.  ENDOCRINE: Denies diabetes, thyroid or cholesterol problems. Asks about Ozempic to help with weight loss as feels will help with back pain.  HEME/LYMPH: Denies bleeding problems.  MUSCULOSKELETAL: Reports now follows with Dr. Diaz, spine doctor/pain management specialist, for treatment and surveillance of chronic back pain. Denies joint swelling.  "Denies edema.  NEUROLOGIC: Denies history of seizures, tremors, paralysis, alteration of gait or coordination.  PSYCHIATRIC: Denies mood swings, depression, anxiety, homicidal or suicidal thoughts. Denies sleep problems.    PE:   VS:   Blood Pressure 110/80   Pulse 82   Temperature 98.1 °F (36.7 °C)   Respiration 18   Height 5' 1" (1.549 m)   Weight 124 kg (273 lb 5.9 oz)   Oxygen Saturation 98%   Body Mass Index 51.65 kg/m²   APPEARANCE:  Well nourished, well developed female, obese and pleasant, alert and oriented in no acute distress.    HEAD: Nontender. Full range of motion.  EYES: PERRL, conjunctiva pink, lids no edema.  EARS: External canal patent, no swelling or redness. TM's shiny and clear.  NOSE: Mucosa and turbinates pink, not swollen. No discharge. Nontender sinuses.  THROAT: No pharyngeal erythema or exudate. No stridor.   NECK: Supple, no mass, thyroid not enlarged.  NODES: No cervical, axillary lymph node enlargement.  CHEST: Normal respiratory effort. Lungs clear to auscultation.  CARDIOVASCULAR: Normal S1, S2. No rubs, murmurs or gallops. PMI not displaced. No carotid bruit. Pedal pulses palpable bilaterally. Edema at legs.  ABDOMEN: Bowel sounds present. Not distended. Soft. No tenderness, masses or organomegaly.  BREAST EXAM: Symmetrical, no external lesions, no discharge, no masses palpated.  PELVIC EXAM: Not examined as patient could not get on table for examination.  RECTAL EXAM: Not examined per patient request.  MUSCULOSKELETAL: No joint deformities or stiffness. She is ambulatory with assistance of rolling walker.  SKIN: No rashes or suspicious lesions, normal color and turgor.  NEUROLOGIC:   Cranial Nerves: II-XII grossly intact.   DTR's: Knees, Ankles 2+ and equal bilaterally. Gait & Posture: Normal gait and fine motion.  PSYCHIATRIC: Patient alert, oriented x 3. Mood/Affect normal without anxiety or depression noted. Judgment/insight good as she makes appropriate decisions during " today's exam.     ASSESSMENT:    ICD-10-CM ICD-9-CM    1. Essential hypertension  I10 401.9 CBC Auto Differential      TSH      Urinalysis      Comprehensive Metabolic Panel      Lipid Panel      2. Hypertensive heart disease with heart failure  I11.0 402.91      428.9       3. Bilateral pulmonary embolism  I26.99 415.19       4. Spinal stenosis of lumbar region, unspecified whether neurogenic claudication present  M48.061 724.02       5. OAB (overactive bladder)  N32.81 596.51 vibegron (GEMTESA) 75 mg Tab      6. Morbid obesity with BMI of 50.0-59.9, adult  E66.01 278.01     Z68.43 V85.43       7. Postmenopausal  Z78.0 V49.81 DXA Bone Density Axial Skeleton 1 or more sites      8. Other screening mammogram  Z12.31 V76.12 Mammo Digital Screening Bilat w/ Jose      9. Colon cancer screening  Z12.11 V76.51 Ambulatory referral/consult to Endo Procedure             PLAN:  1. Age-appropriate counseling-appropriate low-sodium, low-cholesterol, low carbohydrate diet and exercise daily, monthly breast self exam, annual wellness examination.  2. Patient advised to call for results.  3. Continue current medications.  4. Stop Detrol LA. Try Gemtesa 75 mg daily; medication precautions discussed with patient.  5.  Keep follow up with specialist.  6. See me in 1 year for physical.    40 minutes of total time spent on the encounter, which includes face to face time and non-face to face time preparing to see the patient (eg, review of tests), Obtaining and/or reviewing separately obtained history, Documenting clinical information in the electronic or other health record, Independently interpreting results (not separately reported) and communicating results to the patient/family/caregiver, or Care coordination (not separately reported).

## 2023-04-26 NOTE — TELEPHONE ENCOUNTER
Saw patient today. She no longer desires to take Detrol LA as states it no longer works.  She asked for Gemtesa.

## 2023-05-07 PROBLEM — N32.81 OAB (OVERACTIVE BLADDER): Status: ACTIVE | Noted: 2023-05-07

## 2023-05-07 PROBLEM — Z78.0 POSTMENOPAUSAL: Status: ACTIVE | Noted: 2023-05-07

## 2023-05-17 ENCOUNTER — TELEPHONE (OUTPATIENT)
Dept: FAMILY MEDICINE | Facility: CLINIC | Age: 68
End: 2023-05-17
Payer: COMMERCIAL

## 2023-05-17 DIAGNOSIS — N32.81 OAB (OVERACTIVE BLADDER): Primary | ICD-10-CM

## 2023-05-17 DIAGNOSIS — Z76.0 MEDICATION REFILL: ICD-10-CM

## 2023-05-17 RX ORDER — TOLTERODINE 4 MG/1
CAPSULE, EXTENDED RELEASE ORAL
Qty: 90 CAPSULE | Refills: 2 | Status: SHIPPED | OUTPATIENT
Start: 2023-05-17 | End: 2023-11-22 | Stop reason: SDUPTHER

## 2023-05-17 NOTE — TELEPHONE ENCOUNTER
I have put the following orders and/or medications to this note.  Please advise pt stop Gemtesa. .    No orders of the defined types were placed in this encounter.      Medications Ordered This Encounter   Medications    tolterodine (DETROL LA) 4 MG 24 hr capsule     Sig: TAKE 1 CAPSULE BY MOUTH ONCE DAILY.     Dispense:  90 capsule     Refill:  2

## 2023-05-17 NOTE — TELEPHONE ENCOUNTER
----- Message from Titi Irving sent at 5/17/2023  9:29 AM CDT -----  Type: Patient Call Back         Who called: Pt          What is the request in detail: Pt called in regarding the vibegron (GEMTESA) 75 mg Tab . Pt states that she would like to discontinue Medication , pt states that its not working for her and would like to be place back on the Tolterodine 4mg  for overactive bladder          Can the clinic reply by [facility]?no          Would the patient rather a call back or a response via [facility]? Call back          Best call back number: [phone] (mobile)          Additional Information:           Thank You

## 2023-06-13 DIAGNOSIS — Z76.0 MEDICATION REFILL: ICD-10-CM

## 2023-06-13 RX ORDER — DULOXETIN HYDROCHLORIDE 60 MG/1
CAPSULE, DELAYED RELEASE ORAL
Qty: 90 CAPSULE | Refills: 1 | Status: SHIPPED | OUTPATIENT
Start: 2023-06-13 | End: 2024-03-11

## 2023-07-26 ENCOUNTER — TELEPHONE (OUTPATIENT)
Dept: FAMILY MEDICINE | Facility: CLINIC | Age: 68
End: 2023-07-26
Payer: COMMERCIAL

## 2023-07-26 NOTE — TELEPHONE ENCOUNTER
Returned call to pt regarding her Shingrix shot.  She has no idea if she ever had chicken-pox or not.  Advised it would be best to get vaccinated; is not able to catch shingles from her  if she has had c-pox but she is not sure.  She does have both Medicare and [employer].  Would be best for her to get from pharmacy due to Medicare issues.  Verb understanding.  She will notify office back with date of shot in order to update chart.    ADC

## 2023-07-26 NOTE — TELEPHONE ENCOUNTER
----- Message from Magui Tabor sent at 7/26/2023  9:50 AM CDT -----  Contact: April Chen stated her  has Shingles and she is wanting to get scheduled to get the vaccine for it. Please call her at [phone]

## 2023-08-11 ENCOUNTER — OFFICE VISIT (OUTPATIENT)
Dept: INTERNAL MEDICINE | Facility: CLINIC | Age: 68
End: 2023-08-11
Payer: COMMERCIAL

## 2023-08-11 ENCOUNTER — LAB VISIT (OUTPATIENT)
Dept: LAB | Facility: HOSPITAL | Age: 68
End: 2023-08-11
Attending: NURSE PRACTITIONER
Payer: COMMERCIAL

## 2023-08-11 VITALS
OXYGEN SATURATION: 97 % | BODY MASS INDEX: 52.69 KG/M2 | TEMPERATURE: 97 F | SYSTOLIC BLOOD PRESSURE: 130 MMHG | HEART RATE: 75 BPM | RESPIRATION RATE: 15 BRPM | WEIGHT: 278.88 LBS | DIASTOLIC BLOOD PRESSURE: 86 MMHG

## 2023-08-11 DIAGNOSIS — N39.0 URINARY TRACT INFECTION WITH HEMATURIA, SITE UNSPECIFIED: Primary | ICD-10-CM

## 2023-08-11 DIAGNOSIS — R31.9 URINARY TRACT INFECTION WITH HEMATURIA, SITE UNSPECIFIED: Primary | ICD-10-CM

## 2023-08-11 DIAGNOSIS — R31.9 URINARY TRACT INFECTION WITH HEMATURIA, SITE UNSPECIFIED: ICD-10-CM

## 2023-08-11 DIAGNOSIS — N39.0 URINARY TRACT INFECTION WITH HEMATURIA, SITE UNSPECIFIED: ICD-10-CM

## 2023-08-11 LAB
BILIRUB SERPL-MCNC: ABNORMAL MG/DL
BLOOD URINE, POC: ABNORMAL
CLARITY, UA: ABNORMAL
COLOR, POC UA: YELLOW
GLUCOSE UR QL STRIP: ABNORMAL
KETONES UR QL STRIP: ABNORMAL
LEUKOCYTE ESTERASE URINE, POC: ABNORMAL
NITRITE, POC UA: ABNORMAL
PH, POC UA: 7
PROTEIN, POC: 30
SPECIFIC GRAVITY, POC UA: 1
UROBILINOGEN, POC UA: 0.2

## 2023-08-11 PROCEDURE — 1101F PR PT FALLS ASSESS DOC 0-1 FALLS W/OUT INJ PAST YR: ICD-10-PCS | Mod: CPTII,S$GLB,, | Performed by: NURSE PRACTITIONER

## 2023-08-11 PROCEDURE — 3288F PR FALLS RISK ASSESSMENT DOCUMENTED: ICD-10-PCS | Mod: CPTII,S$GLB,, | Performed by: NURSE PRACTITIONER

## 2023-08-11 PROCEDURE — 1125F PR PAIN SEVERITY QUANTIFIED, PAIN PRESENT: ICD-10-PCS | Mod: CPTII,S$GLB,, | Performed by: NURSE PRACTITIONER

## 2023-08-11 PROCEDURE — 1160F PR REVIEW ALL MEDS BY PRESCRIBER/CLIN PHARMACIST DOCUMENTED: ICD-10-PCS | Mod: CPTII,S$GLB,, | Performed by: NURSE PRACTITIONER

## 2023-08-11 PROCEDURE — 3008F BODY MASS INDEX DOCD: CPT | Mod: CPTII,S$GLB,, | Performed by: NURSE PRACTITIONER

## 2023-08-11 PROCEDURE — 1101F PT FALLS ASSESS-DOCD LE1/YR: CPT | Mod: CPTII,S$GLB,, | Performed by: NURSE PRACTITIONER

## 2023-08-11 PROCEDURE — 3008F PR BODY MASS INDEX (BMI) DOCUMENTED: ICD-10-PCS | Mod: CPTII,S$GLB,, | Performed by: NURSE PRACTITIONER

## 2023-08-11 PROCEDURE — 99213 OFFICE O/P EST LOW 20 MIN: CPT | Mod: S$GLB,,, | Performed by: NURSE PRACTITIONER

## 2023-08-11 PROCEDURE — 3079F DIAST BP 80-89 MM HG: CPT | Mod: CPTII,S$GLB,, | Performed by: NURSE PRACTITIONER

## 2023-08-11 PROCEDURE — 99999 PR PBB SHADOW E&M-EST. PATIENT-LVL V: ICD-10-PCS | Mod: PBBFAC,,, | Performed by: NURSE PRACTITIONER

## 2023-08-11 PROCEDURE — 87088 URINE BACTERIA CULTURE: CPT | Performed by: NURSE PRACTITIONER

## 2023-08-11 PROCEDURE — 99213 PR OFFICE/OUTPT VISIT, EST, LEVL III, 20-29 MIN: ICD-10-PCS | Mod: S$GLB,,, | Performed by: NURSE PRACTITIONER

## 2023-08-11 PROCEDURE — 1159F PR MEDICATION LIST DOCUMENTED IN MEDICAL RECORD: ICD-10-PCS | Mod: CPTII,S$GLB,, | Performed by: NURSE PRACTITIONER

## 2023-08-11 PROCEDURE — 81001 URINALYSIS AUTO W/SCOPE: CPT | Mod: S$GLB,,, | Performed by: NURSE PRACTITIONER

## 2023-08-11 PROCEDURE — 87086 URINE CULTURE/COLONY COUNT: CPT | Performed by: NURSE PRACTITIONER

## 2023-08-11 PROCEDURE — 1125F AMNT PAIN NOTED PAIN PRSNT: CPT | Mod: CPTII,S$GLB,, | Performed by: NURSE PRACTITIONER

## 2023-08-11 PROCEDURE — 3075F PR MOST RECENT SYSTOLIC BLOOD PRESS GE 130-139MM HG: ICD-10-PCS | Mod: CPTII,S$GLB,, | Performed by: NURSE PRACTITIONER

## 2023-08-11 PROCEDURE — 99999 PR PBB SHADOW E&M-EST. PATIENT-LVL V: CPT | Mod: PBBFAC,,, | Performed by: NURSE PRACTITIONER

## 2023-08-11 PROCEDURE — 1159F MED LIST DOCD IN RCRD: CPT | Mod: CPTII,S$GLB,, | Performed by: NURSE PRACTITIONER

## 2023-08-11 PROCEDURE — 3288F FALL RISK ASSESSMENT DOCD: CPT | Mod: CPTII,S$GLB,, | Performed by: NURSE PRACTITIONER

## 2023-08-11 PROCEDURE — 87186 SC STD MICRODIL/AGAR DIL: CPT | Performed by: NURSE PRACTITIONER

## 2023-08-11 PROCEDURE — 1160F RVW MEDS BY RX/DR IN RCRD: CPT | Mod: CPTII,S$GLB,, | Performed by: NURSE PRACTITIONER

## 2023-08-11 PROCEDURE — 3075F SYST BP GE 130 - 139MM HG: CPT | Mod: CPTII,S$GLB,, | Performed by: NURSE PRACTITIONER

## 2023-08-11 PROCEDURE — 3079F PR MOST RECENT DIASTOLIC BLOOD PRESSURE 80-89 MM HG: ICD-10-PCS | Mod: CPTII,S$GLB,, | Performed by: NURSE PRACTITIONER

## 2023-08-11 PROCEDURE — 81001 POCT URINALYSIS, DIPSTICK OR TABLET REAGENT, AUTOMATED, WITH MICROSCOP: ICD-10-PCS | Mod: S$GLB,,, | Performed by: NURSE PRACTITIONER

## 2023-08-11 PROCEDURE — 87077 CULTURE AEROBIC IDENTIFY: CPT | Performed by: NURSE PRACTITIONER

## 2023-08-11 RX ORDER — CIPROFLOXACIN 500 MG/1
500 TABLET ORAL EVERY 12 HOURS
Qty: 10 TABLET | Refills: 0 | Status: SHIPPED | OUTPATIENT
Start: 2023-08-11

## 2023-08-11 NOTE — PROGRESS NOTES
CC:Dysuria.  April Castillo is a 68 y.o. female with a new complaint of dysuria and urinary frequency.   The patient denies nausea, sweats, and vomiting.  Symptoms have been present for 3 day(s).   Treatments tried include:increase water and this has not helped the symptoms.    Review of patient's allergies indicates:  No Known Allergies    Outpatient Medications Prior to Visit   Medication Sig Dispense Refill    b complex vitamins tablet Take 1 tablet by mouth once daily.      bumetanide (BUMEX) 2 MG tablet Take 2 mg by mouth 2 (two) times daily.      cholecalciferol, vitamin D3, 125 mcg (5,000 unit) Tab Take 5,000 Units by mouth once daily.      DULoxetine (CYMBALTA) 60 MG capsule TAKE 1 CAPSULE BY MOUTH EVERY DAY 90 capsule 1    ELIQUIS 5 mg Tab TAKE 2 TABLETS BY MOUTH TWICE DAILY UNTIL 12/11 THEN TAKE 1 TABLET TWICE DAILY FOR 6 MONTHS 60 tablet 0    FOLIC ACID/MULTIVIT-MIN/LUTEIN (CENTRUM SILVER ORAL) Take by mouth.      KLOR-CON 10 10 mEq TbSR Take 10 mEq by mouth once daily.      metoprolol tartrate (LOPRESSOR) 25 MG tablet Take 25 mg by mouth 2 (two) times daily.  3    potassium chloride SA (K-DUR,KLOR-CON) 20 MEQ tablet Take 1 tablet (20 mEq total) by mouth once daily. 30 tablet 5    tolterodine (DETROL LA) 4 MG 24 hr capsule TAKE 1 CAPSULE BY MOUTH ONCE DAILY. 90 capsule 2    topiramate (TOPAMAX) 25 MG tablet Take 25 mg by mouth 2 (two) times daily.      tramadol (ULTRAM) 50 mg tablet Take 50 mg by mouth 4 (four) times daily.  2    TURMERIC-TURMERIC ROOT EXTRACT ORAL Take by mouth.      vibegron (GEMTESA) 75 mg Tab Take 75 mg by mouth once daily. 90 tablet 0    walker (ULTRA-LIGHT ROLLATOR) Misc DX:  M48.00; G89.29 1 each 0    zonisamide (ZONEGRAN) 100 MG Cap Take by mouth. 4 Capsule Oral Every evening       No facility-administered medications prior to visit.        Physical Exam   /86 (BP Location: Right arm, Patient Position: Sitting, BP Method: Medium (Manual))   Pulse 75   Temp 97.2 °F (36.2 °C)  (Tympanic)   Resp 15   Wt 126.5 kg (278 lb 14.1 oz)   SpO2 97%   BMI 52.69 kg/m²   Constitutional: The patient appears well-developed and well-nourished. No distress.   Cardiovascular: Normal rate, regular rhythm and normal heart sounds.  Exam reveals no gallop and no friction rub.    No murmur heard.  Pulmonary/Chest: Effort normal and breath sounds normal. No respiratory distress. She has no wheezes. She has no rales.   Abdominal: Soft. Bowel sounds are normal. The patient exhibits no distension and no mass. There is no tenderness in the suprapubic area. There is no rebound, no guarding and no CVA tenderness. No hernia.   Skin: The patient is not diaphoretic.     The patient had a urinalysis performed today and it was abnormal.     Encounter Diagnosis   Name Primary?    Urinary tract infection with hematuria, site unspecified Yes       PLAN:  April was seen today for urinary tract infection.    Diagnoses and all orders for this visit:    Urinary tract infection with hematuria, site unspecified  -     POCT URINE DIPSTICK WITH MICROSCOPE, AUTOMATED  -     Urine culture; Future  -     ciprofloxacin HCl (CIPRO) 500 MG tablet; Take 1 tablet (500 mg total) by mouth every 12 (twelve) hours.      Medications Ordered This Encounter   Medications    ciprofloxacin HCl (CIPRO) 500 MG tablet     Sig: Take 1 tablet (500 mg total) by mouth every 12 (twelve) hours.     Dispense:  10 tablet     Refill:  0     [unfilled]  Orders Placed This Encounter   Procedures    Urine culture           Standing Status:   Future     Number of Occurrences:   1     Standing Expiration Date:   10/9/2024    POCT URINE DIPSTICK WITH MICROSCOPE, AUTOMATED     RTC if symptoms are worsening or changing significantly or if not improved by the end of therapy.

## 2023-08-13 LAB — BACTERIA UR CULT: ABNORMAL

## 2023-10-26 ENCOUNTER — PATIENT MESSAGE (OUTPATIENT)
Dept: ADMINISTRATIVE | Facility: HOSPITAL | Age: 68
End: 2023-10-26
Payer: COMMERCIAL

## 2023-11-22 DIAGNOSIS — N32.81 OAB (OVERACTIVE BLADDER): ICD-10-CM

## 2023-11-22 RX ORDER — TOLTERODINE 4 MG/1
CAPSULE, EXTENDED RELEASE ORAL
Qty: 90 CAPSULE | Refills: 0 | Status: SHIPPED | OUTPATIENT
Start: 2023-11-22 | End: 2023-12-07 | Stop reason: SDUPTHER

## 2023-11-22 NOTE — TELEPHONE ENCOUNTER
Refill Routing Note   Medication(s) are not appropriate for processing by [facility] for the following reason(s):        Required labs outdated    [facility] action(s):  Defer               Appointments  past 12m or future 3m with PCP    Date Provider   Last Visit   4/26/2023 Krista Casas MD   Next Visit   4/26/2024 Krista Casas MD   ED visits in past 90 days: 0        Note composed:11:46 AM 11/22/2023

## 2023-11-22 NOTE — TELEPHONE ENCOUNTER
No care due was identified.  [facility] Embedded Care Due Messages. Reference number: [id number].   11/22/2023 11:05:40 AM CST   Stated "nail went through left middle finger in a drawer  1 hour ago '

## 2023-11-29 DIAGNOSIS — N32.81 OAB (OVERACTIVE BLADDER): ICD-10-CM

## 2023-11-29 NOTE — TELEPHONE ENCOUNTER
----- Message from Mikaela Doyle sent at 11/29/2023  1:08 PM CST -----  Regarding: PATIENT ADVICE                reply in [facility]: no      Please refill the medication listed below. Please call the patient     [phone] (m)        Medication     tolterodine (DETROL LA) 4 MG 24 hr capsule  // 90 DAY SUPPLY       Preferred Pharmacy:    [facility]/pharmacy #[id number] [place] - [place]  [address] AT AT [facility]   Phone: [phone]  Fax: [phone]

## 2023-11-29 NOTE — TELEPHONE ENCOUNTER
No care due was identified.  [facility] Embedded Care Due Messages. Reference number: [id number].   11/29/2023 3:34:51 PM CST

## 2023-12-03 RX ORDER — TOLTERODINE 4 MG/1
4 CAPSULE, EXTENDED RELEASE ORAL DAILY
Qty: 90 CAPSULE | Refills: 0 | OUTPATIENT
Start: 2023-12-03

## 2023-12-05 DIAGNOSIS — N32.81 OAB (OVERACTIVE BLADDER): ICD-10-CM

## 2023-12-05 RX ORDER — METOPROLOL TARTRATE 25 MG/1
25 TABLET, FILM COATED ORAL 2 TIMES DAILY
Refills: 3 | Status: CANCELLED | OUTPATIENT
Start: 2023-12-05

## 2023-12-05 RX ORDER — TOLTERODINE 4 MG/1
4 CAPSULE, EXTENDED RELEASE ORAL DAILY
Qty: 90 CAPSULE | Refills: 0 | Status: CANCELLED | OUTPATIENT
Start: 2023-12-05

## 2023-12-05 NOTE — TELEPHONE ENCOUNTER
No care due was identified.  [facility] Embedded Care Due Messages. Reference number: [id number].   12/05/2023 9:31:23 AM CST

## 2023-12-06 NOTE — PROGRESS NOTES
SUBJECTIVE:     April Castillo is a 68 y.o. female is here today for:  Back Pain    HPI:    Mrs. Castillo is here with c/o back pain.  Chronic issue, followed by Dr. Clark Basilio, on tramadol as ordered.  She is now having increased back pain with urinary issues, wants to make sure not a UTI.  Reports urinary frequency and urgency but has h/o OAB (out of med).      REVIEW OF SYSTEMS:  Review of Systems   Constitutional:  Negative for chills and fever.   Cardiovascular: Negative.    Gastrointestinal: Negative.    Genitourinary:  Positive for frequency and urgency. Negative for dysuria, flank pain and hematuria.   Musculoskeletal:  Positive for back pain (chronic lower).   Neurological:  Negative for dizziness and headaches.         CURRENT ALLERGIES:  Review of patient's allergies indicates:  No Known Allergies    CURRENT PROBLEM LIST:  Patient Active Problem List   Diagnosis    Spinal stenosis    Gout with manifestations    Chronic pain    Bilateral pulmonary embolism    Essential hypertension    Morbid obesity with BMI of 50.0-59.9, adult    Hypertensive heart disease with heart failure    Postmenopausal    OAB (overactive bladder)       CURRENT MEDICATION LIST:  Current Outpatient Medications   Medication Instructions    b complex vitamins tablet 1 tablet, Oral, Daily    bumetanide (BUMEX) 2 mg, Oral, 2 times daily    cholecalciferol (vitamin D3) 5,000 Units, Oral, Daily    ciprofloxacin HCl (CIPRO) 500 mg, Oral, Every 12 hours    DULoxetine (CYMBALTA) 60 MG capsule TAKE 1 CAPSULE BY MOUTH EVERY DAY    ELIQUIS 5 mg Tab TAKE 2 TABLETS BY MOUTH TWICE DAILY UNTIL 12/11 THEN TAKE 1 TABLET TWICE DAILY FOR 6 MONTHS    FOLIC ACID/MULTIVIT-MIN/LUTEIN (CENTRUM SILVER ORAL) Oral    GEMTESA 75 mg, Oral, Daily    KLOR-CON 10 10 mEq TbSR 10 mEq, Oral, Daily    metoprolol tartrate (LOPRESSOR) 25 mg, Oral, 2 times daily    potassium chloride SA (K-DUR,KLOR-CON) 20 MEQ tablet 20 mEq, Oral, Daily    tolterodine (DETROL LA) 4 MG 24 hr  "capsule TAKE 1 CAPSULE BY MOUTH ONCE DAILY    topiramate (TOPAMAX) 25 mg, Oral, 2 times daily    traMADoL (ULTRAM) 50 mg, Oral, 4 times daily    TURMERIC-TURMERIC ROOT EXTRACT ORAL Oral    walker (ULTRA-LIGHT ROLLATOR) Misc DX:  M48.00; G89.29    zonisamide (ZONEGRAN) 100 MG Cap Take by mouth. 4 Capsule Oral Every evening         HISTORY:  Past medical, surgical, family and social histories have been reviewed today.      OBJECTIVE:     Vitals:    12/07/23 1203   BP: 128/74   Pulse: 83   Temp: 97 °F (36.1 °C)   SpO2: 98%   Weight: 127.9 kg (281 lb 13.7 oz)   Height: 5' 1" (1.549 m)   PainSc:   5   PainLoc: Back       Physical Exam  Vitals reviewed.   HENT:      Head: Normocephalic and atraumatic.   Abdominal:      Palpations: Abdomen is soft.      Tenderness: There is no right CVA tenderness, left CVA tenderness or guarding.   Neurological:      Mental Status: She is alert and oriented to person, place, and time. Mental status is at baseline.         ASSESSMENT:     1. Spinal stenosis of lumbar region, unspecified whether neurogenic claudication present ---- chronic issue, on tramadol per Pain Mgmt    2. OAB (overactive bladder) --- refilled  -     tolterodine (DETROL LA) 4 MG 24 hr capsule; Take 1 capsule (4 mg total) by mouth once daily.  Dispense: 30 capsule; Refill: 0    3. Urinary symptom or sign  -     Urinalysis, Reflex to Urine Culture; Future; Expected date: 12/07/2023      PLAN:     UA pending.  RTC as directed and/or prn.        ZAC Khan  [facility]       20 minutes of total time spent on the encounter, which includes face to face time and non-face to face time preparing to see the patient.  This includes obtaining and/or reviewing separately obtained history, performing a medically appropriate examination and/or evaluation, and counseling and educating the patient/family/caregiver.  Includes documenting clinical information in the electronic or other health record, " independently interpreting results (not separately reported) and communicating results to the patient/family/caregiver, with care coordination (not separately reported).  Medications, tests and/or procedures ordered as necessary along with referring and communicating with other health professionals (when not separately reported).

## 2023-12-07 ENCOUNTER — LAB VISIT (OUTPATIENT)
Dept: LAB | Facility: HOSPITAL | Age: 68
End: 2023-12-07
Attending: REGISTERED NURSE
Payer: COMMERCIAL

## 2023-12-07 ENCOUNTER — OFFICE VISIT (OUTPATIENT)
Dept: FAMILY MEDICINE | Facility: CLINIC | Age: 68
End: 2023-12-07
Payer: COMMERCIAL

## 2023-12-07 VITALS
BODY MASS INDEX: 53.22 KG/M2 | WEIGHT: 281.88 LBS | DIASTOLIC BLOOD PRESSURE: 74 MMHG | TEMPERATURE: 97 F | HEART RATE: 83 BPM | OXYGEN SATURATION: 98 % | HEIGHT: 61 IN | SYSTOLIC BLOOD PRESSURE: 128 MMHG

## 2023-12-07 DIAGNOSIS — R39.9 URINARY SYMPTOM OR SIGN: ICD-10-CM

## 2023-12-07 DIAGNOSIS — N32.81 OAB (OVERACTIVE BLADDER): ICD-10-CM

## 2023-12-07 DIAGNOSIS — M48.061 SPINAL STENOSIS OF LUMBAR REGION, UNSPECIFIED WHETHER NEUROGENIC CLAUDICATION PRESENT: Primary | ICD-10-CM

## 2023-12-07 LAB
BACTERIA #/AREA URNS AUTO: ABNORMAL /HPF
BILIRUB UR QL STRIP: NEGATIVE
CLARITY UR REFRACT.AUTO: CLEAR
COLOR UR AUTO: ABNORMAL
GLUCOSE UR QL STRIP: NEGATIVE
HGB UR QL STRIP: ABNORMAL
KETONES UR QL STRIP: NEGATIVE
LEUKOCYTE ESTERASE UR QL STRIP: NEGATIVE
MICROSCOPIC COMMENT: ABNORMAL
NITRITE UR QL STRIP: NEGATIVE
PH UR STRIP: 7 [PH] (ref 5–8)
PROT UR QL STRIP: NEGATIVE
RBC #/AREA URNS AUTO: 6 /HPF (ref 0–4)
SP GR UR STRIP: 1 (ref 1–1.03)
SQUAMOUS #/AREA URNS AUTO: 1 /HPF
URN SPEC COLLECT METH UR: ABNORMAL
WBC #/AREA URNS AUTO: 2 /HPF (ref 0–5)

## 2023-12-07 PROCEDURE — 3078F DIAST BP <80 MM HG: CPT | Mod: CPTII,S$GLB,, | Performed by: REGISTERED NURSE

## 2023-12-07 PROCEDURE — 3288F FALL RISK ASSESSMENT DOCD: CPT | Mod: CPTII,S$GLB,, | Performed by: REGISTERED NURSE

## 2023-12-07 PROCEDURE — 1159F MED LIST DOCD IN RCRD: CPT | Mod: CPTII,S$GLB,, | Performed by: REGISTERED NURSE

## 2023-12-07 PROCEDURE — 99213 OFFICE O/P EST LOW 20 MIN: CPT | Mod: S$GLB,,, | Performed by: REGISTERED NURSE

## 2023-12-07 PROCEDURE — 99999 PR PBB SHADOW E&M-EST. PATIENT-LVL V: CPT | Mod: PBBFAC,,, | Performed by: REGISTERED NURSE

## 2023-12-07 PROCEDURE — 1159F PR MEDICATION LIST DOCUMENTED IN MEDICAL RECORD: ICD-10-PCS | Mod: CPTII,S$GLB,, | Performed by: REGISTERED NURSE

## 2023-12-07 PROCEDURE — 81001 URINALYSIS AUTO W/SCOPE: CPT | Performed by: REGISTERED NURSE

## 2023-12-07 PROCEDURE — 3074F PR MOST RECENT SYSTOLIC BLOOD PRESSURE < 130 MM HG: ICD-10-PCS | Mod: CPTII,S$GLB,, | Performed by: REGISTERED NURSE

## 2023-12-07 PROCEDURE — 1125F AMNT PAIN NOTED PAIN PRSNT: CPT | Mod: CPTII,S$GLB,, | Performed by: REGISTERED NURSE

## 2023-12-07 PROCEDURE — 99999 PR PBB SHADOW E&M-EST. PATIENT-LVL V: ICD-10-PCS | Mod: PBBFAC,,, | Performed by: REGISTERED NURSE

## 2023-12-07 PROCEDURE — 99213 PR OFFICE/OUTPT VISIT, EST, LEVL III, 20-29 MIN: ICD-10-PCS | Mod: S$GLB,,, | Performed by: REGISTERED NURSE

## 2023-12-07 PROCEDURE — 1101F PR PT FALLS ASSESS DOC 0-1 FALLS W/OUT INJ PAST YR: ICD-10-PCS | Mod: CPTII,S$GLB,, | Performed by: REGISTERED NURSE

## 2023-12-07 PROCEDURE — 1125F PR PAIN SEVERITY QUANTIFIED, PAIN PRESENT: ICD-10-PCS | Mod: CPTII,S$GLB,, | Performed by: REGISTERED NURSE

## 2023-12-07 PROCEDURE — 3008F PR BODY MASS INDEX (BMI) DOCUMENTED: ICD-10-PCS | Mod: CPTII,S$GLB,, | Performed by: REGISTERED NURSE

## 2023-12-07 PROCEDURE — 3288F PR FALLS RISK ASSESSMENT DOCUMENTED: ICD-10-PCS | Mod: CPTII,S$GLB,, | Performed by: REGISTERED NURSE

## 2023-12-07 PROCEDURE — 1101F PT FALLS ASSESS-DOCD LE1/YR: CPT | Mod: CPTII,S$GLB,, | Performed by: REGISTERED NURSE

## 2023-12-07 PROCEDURE — 3078F PR MOST RECENT DIASTOLIC BLOOD PRESSURE < 80 MM HG: ICD-10-PCS | Mod: CPTII,S$GLB,, | Performed by: REGISTERED NURSE

## 2023-12-07 PROCEDURE — 3074F SYST BP LT 130 MM HG: CPT | Mod: CPTII,S$GLB,, | Performed by: REGISTERED NURSE

## 2023-12-07 PROCEDURE — 3008F BODY MASS INDEX DOCD: CPT | Mod: CPTII,S$GLB,, | Performed by: REGISTERED NURSE

## 2023-12-07 RX ORDER — TOPIRAMATE 50 MG/1
50 TABLET, FILM COATED ORAL 2 TIMES DAILY
COMMUNITY
Start: 2023-10-12

## 2023-12-07 RX ORDER — OXYMORPHONE HYDROCHLORIDE 10 MG/1
TABLET ORAL
COMMUNITY

## 2023-12-07 RX ORDER — TOLTERODINE 4 MG/1
4 CAPSULE, EXTENDED RELEASE ORAL DAILY
Qty: 30 CAPSULE | Refills: 0 | Status: SHIPPED | OUTPATIENT
Start: 2023-12-07 | End: 2023-12-11 | Stop reason: SDUPTHER

## 2023-12-08 ENCOUNTER — TELEPHONE (OUTPATIENT)
Dept: FAMILY MEDICINE | Facility: CLINIC | Age: 68
End: 2023-12-08
Payer: COMMERCIAL

## 2023-12-08 NOTE — TELEPHONE ENCOUNTER
----- Message from Kim Olivera sent at 12/8/2023 10:31 AM CST -----  Contact: April  Type:  Test Results    Who Called: April  Name of Test (Lab/Mammo/Etc): Urine analysis/Viral test  Date of Test: 12/7/23  Ordering Provider: Calixto Minor  [facility] the [facility]  Would the patient rather a call back or a response via [facility]? call  Best Call Back Number: [phone]   Additional Information:  Patient request to be notified of test results and, if necessary, request to receive medicine.  Thank you,  GH

## 2023-12-09 ENCOUNTER — NURSE TRIAGE (OUTPATIENT)
Dept: ADMINISTRATIVE | Facility: CLINIC | Age: 68
End: 2023-12-09
Payer: COMMERCIAL

## 2023-12-09 DIAGNOSIS — N32.81 OAB (OVERACTIVE BLADDER): ICD-10-CM

## 2023-12-09 NOTE — TELEPHONE ENCOUNTER
Pt had culture done for UTI 2 days ago, but has not heard back with results. Reporting mid back pain that sometimes radiates down her leg. No urinary symptoms presently.    Care advice to be seen within 3 days. Pt verbalized she may go to urgent care, otherwise will reach out to her provider's office Monday morning. Message routed as well.  Reason for Disposition   [1] Pain radiates into the thigh or further down the leg AND [2] one leg    Additional Information   Negative: Passed out (i.e., lost consciousness, collapsed and was not responding)   Negative: Shock suspected (e.g., cold/pale/clammy skin, too weak to stand, low BP, rapid pulse)   Negative: Sounds like a life-threatening emergency to the triager   Negative: [1] SEVERE back pain (e.g., excruciating) AND [2] sudden onset AND [3] age > 60 years   Negative: [1] Unable to urinate (or only a few drops) > 4 hours AND [2] bladder feels very full (e.g., palpable bladder or strong urge to urinate)   Negative: [1] Loss of bladder or bowel control (urine or bowel incontinence; wetting self, leaking stool) AND [2] new-onset   Negative: Numbness in groin or rectal area (i.e., loss of sensation)   Negative: [1] SEVERE abdominal pain AND [2] present > 1 hour   Negative: [1] Abdominal pain AND [2] age > 60 years   Negative: Weakness of a leg or foot (e.g., unable to bear weight, dragging foot)   Negative: Unable to walk   Negative: Patient sounds very sick or weak to the triager   Negative: [1] SEVERE back pain (e.g., excruciating, unable to do any normal activities) AND [2] not improved 2 hours after pain medicine   Negative: [1] Pain radiates into the thigh or further down the leg AND [2] both legs   Negative: [1] Fever > 100.0 F (37.8 C) AND [2] flank pain (i.e., in side, below ribs and above hip)   Negative: [1] Pain or burning with passing urine (urination) AND [2] flank pain (i.e., in side, below ribs and above hip)   Negative: Numbness in a leg or foot (i.e., loss  "of sensation)   Negative: [1] Numbness in an arm or hand (i.e., loss of sensation) AND [2] upper back pain   Negative: High-risk adult (e.g., history of cancer, HIV, or IV drug use)   Negative: Soft tissue infection (e.g., abscess, cellulitis) or other serious infection (e.g., bacteremia) in last 2 weeks   Negative: [1] Fever AND [2] no symptoms of UTI  (Exception: Has generalized muscle pains, not localized back pain.)   Negative: Rash in same area as pain (may be described as "small blisters")   Negative: Blood in urine (red, pink, or tea-colored)   Negative: [1] MODERATE back pain (e.g., interferes with normal activities) AND [2] present > 3 days    Protocols used: Back Pain-A-AH    "

## 2023-12-11 RX ORDER — TOLTERODINE 4 MG/1
4 CAPSULE, EXTENDED RELEASE ORAL DAILY
Qty: 30 CAPSULE | Refills: 0 | Status: SHIPPED | OUTPATIENT
Start: 2023-12-11 | End: 2024-02-18

## 2023-12-11 NOTE — TELEPHONE ENCOUNTER
Patient called in to get her urine results. Results were given, pt verbally understood information. The Tolterodine Rx wasn't sent to the pharmacy, the Rx says print.

## 2023-12-12 ENCOUNTER — TELEPHONE (OUTPATIENT)
Dept: FAMILY MEDICINE | Facility: CLINIC | Age: 68
End: 2023-12-12
Payer: COMMERCIAL

## 2023-12-12 NOTE — TELEPHONE ENCOUNTER
"Please advise per pt's portal message:    "Comments: tolterodine     Pt states that [facility] will not fill Rx until January. She is looking for a new Rx to be sent to the pharmacy to clear up the infection. She is requesting a call back to advise on the New Rx that will be sent into the pharmacy. "    LOUIS Olguin"

## 2023-12-12 NOTE — TELEPHONE ENCOUNTER
----- Message from Maritza Troy sent at 12/11/2023  3:51 PM CST -----  Contact: Pt @[phone]  1MEDICALADVICE     Patient is calling for Medical Advice regarding:    tolterodine      Pharmacy name and phone#:   [id number] - [place], [place] - [address] AT [facility]  [address] [facility] [address]  Phone: [phone] Fax: [phone]         Would like response via [facility]:  call back     Comments:  Pt states that [facility] not fill Rx until January. She is looking for a new Rx to be sent to the pharmacy to clear up the infection. She is requesting a call back to advise on the New Rx that will be sent into the pharmacy.

## 2023-12-12 NOTE — TELEPHONE ENCOUNTER
Pt stating that she would like medication to help clear up the blood I informed pt about the findings pt states she wants something called in please advise

## 2023-12-12 NOTE — TELEPHONE ENCOUNTER
Microscopic blood in urine is not a new thing for her, I do see this dating back on lab at least past 5 yrs.  She can discuss further with Urology.

## 2023-12-12 NOTE — TELEPHONE ENCOUNTER
Rx was printed for her at appt when she was here.  Advised to get it using [facility] card which she was provided.  RX was stapled to the [facility]RX card, can get 1 month supply at [facility].  This was all discussed at her appt and rx/card given to her at the appt.

## 2023-12-13 ENCOUNTER — TELEPHONE (OUTPATIENT)
Dept: FAMILY MEDICINE | Facility: CLINIC | Age: 68
End: 2023-12-13
Payer: COMMERCIAL

## 2023-12-13 NOTE — TELEPHONE ENCOUNTER
----- Message from Luis Moses sent at 12/13/2023 11:45 AM CST -----  Contact: ally  Ally is calling regarding a RX she states was suppose to be called in for her having blood in her urine.  Please give her a call back at [phone]

## 2024-02-06 DIAGNOSIS — Z12.11 COLON CANCER SCREENING: ICD-10-CM

## 2024-02-16 DIAGNOSIS — N32.81 OAB (OVERACTIVE BLADDER): ICD-10-CM

## 2024-02-18 RX ORDER — TOLTERODINE 4 MG/1
4 CAPSULE, EXTENDED RELEASE ORAL
Qty: 30 CAPSULE | Refills: 0 | Status: SHIPPED | OUTPATIENT
Start: 2024-02-18 | End: 2024-03-11

## 2024-03-09 DIAGNOSIS — Z76.0 MEDICATION REFILL: ICD-10-CM

## 2024-03-09 NOTE — TELEPHONE ENCOUNTER
No care due was identified.  [facility] Embedded Care Due Messages. Reference number: [id number].   3/09/2024 3:10:09 PM CST

## 2024-03-10 DIAGNOSIS — N32.81 OAB (OVERACTIVE BLADDER): ICD-10-CM

## 2024-03-11 RX ORDER — DULOXETIN HYDROCHLORIDE 60 MG/1
CAPSULE, DELAYED RELEASE ORAL
Qty: 90 CAPSULE | Refills: 0 | Status: SHIPPED | OUTPATIENT
Start: 2024-03-11 | End: 2024-03-31

## 2024-03-11 RX ORDER — TOLTERODINE 4 MG/1
4 CAPSULE, EXTENDED RELEASE ORAL
Qty: 30 CAPSULE | Refills: 0 | Status: SHIPPED | OUTPATIENT
Start: 2024-03-11 | End: 2024-03-31

## 2024-03-11 NOTE — TELEPHONE ENCOUNTER
Refill Routing Note   Medication(s) are not appropriate for processing by [facility] for the following reason(s):        Required labs outdated    [facility] action(s):  Defer               Appointments  past 12m or future 3m with PCP    Date Provider   Last Visit   4/26/2023 Krista Casas MD   Next Visit   3/10/2024 Krista Casas MD   ED visits in past 90 days: 0        Note composed:2:58 AM 03/11/2024

## 2024-03-11 NOTE — TELEPHONE ENCOUNTER
No care due was identified.  [facility] Embedded Care Due Messages. Reference number: [id number].   3/11/2024 1:05:48 PM CDT

## 2024-03-11 NOTE — TELEPHONE ENCOUNTER
Refill Routing Note   Medication(s) are not appropriate for processing by [facility] for the following reason(s):        Required labs outdated  New or recently adjusted medication    [facility] action(s):  Defer               Appointments  past 12m or future 3m with PCP    Date Provider   Last Visit   4/26/2023 Krista Casas MD   Next Visit   4/26/2024 Krista Casas MD   ED visits in past 90 days: 0        Note composed:1:06 PM 03/11/2024

## 2024-03-29 DIAGNOSIS — Z76.0 MEDICATION REFILL: ICD-10-CM

## 2024-03-29 DIAGNOSIS — N32.81 OAB (OVERACTIVE BLADDER): ICD-10-CM

## 2024-03-31 RX ORDER — DULOXETIN HYDROCHLORIDE 60 MG/1
CAPSULE, DELAYED RELEASE ORAL
Qty: 90 CAPSULE | Refills: 0 | Status: SHIPPED | OUTPATIENT
Start: 2024-03-31

## 2024-03-31 RX ORDER — TOLTERODINE 4 MG/1
4 CAPSULE, EXTENDED RELEASE ORAL
Qty: 30 CAPSULE | Refills: 0 | Status: SHIPPED | OUTPATIENT
Start: 2024-03-31 | End: 2024-05-20 | Stop reason: SDUPTHER

## 2024-03-31 NOTE — TELEPHONE ENCOUNTER
Refill Routing Note   Medication(s) are not appropriate for processing by [facility] for the following reason(s):        Required labs outdated    [facility] action(s):  Defer               Appointments  past 12m or future 3m with PCP    Date Provider   Last Visit   4/26/2023 Krista Casas MD   Next Visit   4/26/2024 Krista Casas MD   ED visits in past 90 days: 0        Note composed:1:46 AM 03/31/2024

## 2024-05-01 DIAGNOSIS — Z78.0 MENOPAUSE: ICD-10-CM

## 2024-05-06 ENCOUNTER — TELEPHONE (OUTPATIENT)
Dept: FAMILY MEDICINE | Facility: CLINIC | Age: 69
End: 2024-05-06
Payer: COMMERCIAL

## 2024-05-06 NOTE — TELEPHONE ENCOUNTER
----- Message from Stan Milian MA sent at 5/6/2024  1:22 PM CDT -----  Contact: ally@[phone]  Pt called                Pt is requesting a clal back to get past missed appt on 04/26/24 to be rescheduled as soon asp possible.

## 2024-05-16 DIAGNOSIS — N32.81 OAB (OVERACTIVE BLADDER): ICD-10-CM

## 2024-05-16 RX ORDER — TOLTERODINE 4 MG/1
4 CAPSULE, EXTENDED RELEASE ORAL
Qty: 30 CAPSULE | Refills: 0 | OUTPATIENT
Start: 2024-05-16

## 2024-05-16 NOTE — TELEPHONE ENCOUNTER
No care due was identified.  [facility] Embedded Care Due Messages. Reference number: [id number].   5/16/2024 12:19:36 AM CDT

## 2024-05-16 NOTE — TELEPHONE ENCOUNTER
No care due was identified.  [facility] Embedded Care Due Messages. Reference number: [id number].   5/16/2024 10:48:52 AM CDT

## 2024-05-16 NOTE — TELEPHONE ENCOUNTER
Refill Routing Note   Medication(s) are not appropriate for processing by [facility] for the following reason(s):        Required labs outdated    [facility] action(s):  Defer        Medication Therapy Plan: FOV ON 05/23/2024 WITH BENNETT BINGHAM; WILL GIVE PEND A 15 DAY SUPPLY TILL APPT AND LEAVE MESSAGE FOR PHARMACY FOR FUTURE REFILLS      Appointments  past 12m or future 3m with PCP    Date Provider   Last Visit   4/26/2023 Krista Casas MD   Next Visit   Visit date not found Krista Casas MD   ED visits in past 90 days: 0        Note composed:4:32 PM 05/16/2024

## 2024-05-16 NOTE — TELEPHONE ENCOUNTER
Refill Decision Note   April Castillo  is requesting a refill authorization.  Brief Assessment and Rationale for Refill:  Quick Discontinue     Medication Therapy Plan: DUPLICATE; RECENTLY REFUSED PT NEEDS AN APPT      Comments:     Note composed:9:50 AM 05/16/2024

## 2024-05-20 ENCOUNTER — TELEPHONE (OUTPATIENT)
Dept: FAMILY MEDICINE | Facility: CLINIC | Age: 69
End: 2024-05-20
Payer: COMMERCIAL

## 2024-05-20 DIAGNOSIS — N32.81 OAB (OVERACTIVE BLADDER): ICD-10-CM

## 2024-05-20 RX ORDER — TOLTERODINE 4 MG/1
4 CAPSULE, EXTENDED RELEASE ORAL DAILY
Qty: 30 CAPSULE | Refills: 0 | Status: SHIPPED | OUTPATIENT
Start: 2024-05-20 | End: 2024-06-15

## 2024-05-20 NOTE — TELEPHONE ENCOUNTER
----- Message from Rosalia Duffy sent at 5/20/2024  8:46 AM CDT -----  Name of Who is Calling:pt           What is the request in detail:patient requesting a refill. Patient is completely out of medication and requesting to be sent to different pharmacy for this medication only today.    tolterodine (DETROL LA) 4 MG 24 hr [id number]/31/2024-NoSig - Route: TAKE 1 CAPSULE BY MOUTH EVERY DAY - OralSent to pharmacy as: tolterodine (DETROL LA) 4 MG 24 hr capsuleClass: NormalOrder: [id number]/Time Signed: 3/31/2024 17:39E-Prescribing Status: Receipt confirmed by pharmacy (3/31/2024  5:39 PM CDT)         [facility] [address] [place] [address] [address] [address]  Phone: [phone] Fax: [phone]          Can the clinic reply by [facility]:  no         What Number to Call Back [facility]: [phone]

## 2024-05-20 NOTE — TELEPHONE ENCOUNTER
Patient is completely out of medication and requesting to be sent to different pharmacy for this medication only today. tolterodine but [facility] [place]

## 2024-05-23 ENCOUNTER — OFFICE VISIT (OUTPATIENT)
Dept: FAMILY MEDICINE | Facility: CLINIC | Age: 69
End: 2024-05-23
Payer: COMMERCIAL

## 2024-05-23 VITALS
RESPIRATION RATE: 18 BRPM | HEART RATE: 88 BPM | DIASTOLIC BLOOD PRESSURE: 72 MMHG | BODY MASS INDEX: 53.99 KG/M2 | WEIGHT: 285.94 LBS | HEIGHT: 61 IN | SYSTOLIC BLOOD PRESSURE: 124 MMHG | TEMPERATURE: 98 F | OXYGEN SATURATION: 97 %

## 2024-05-23 DIAGNOSIS — L03.115 CELLULITIS OF BOTH LOWER EXTREMITIES: Primary | ICD-10-CM

## 2024-05-23 DIAGNOSIS — L03.116 CELLULITIS OF BOTH LOWER EXTREMITIES: Primary | ICD-10-CM

## 2024-05-23 PROCEDURE — 3078F DIAST BP <80 MM HG: CPT | Mod: CPTII,S$GLB,, | Performed by: REGISTERED NURSE

## 2024-05-23 PROCEDURE — 3008F BODY MASS INDEX DOCD: CPT | Mod: CPTII,S$GLB,, | Performed by: REGISTERED NURSE

## 2024-05-23 PROCEDURE — 1125F AMNT PAIN NOTED PAIN PRSNT: CPT | Mod: CPTII,S$GLB,, | Performed by: REGISTERED NURSE

## 2024-05-23 PROCEDURE — 3074F SYST BP LT 130 MM HG: CPT | Mod: CPTII,S$GLB,, | Performed by: REGISTERED NURSE

## 2024-05-23 PROCEDURE — 1101F PT FALLS ASSESS-DOCD LE1/YR: CPT | Mod: CPTII,S$GLB,, | Performed by: REGISTERED NURSE

## 2024-05-23 PROCEDURE — 3288F FALL RISK ASSESSMENT DOCD: CPT | Mod: CPTII,S$GLB,, | Performed by: REGISTERED NURSE

## 2024-05-23 PROCEDURE — 99213 OFFICE O/P EST LOW 20 MIN: CPT | Mod: S$GLB,,, | Performed by: REGISTERED NURSE

## 2024-05-23 PROCEDURE — 99999 PR PBB SHADOW E&M-EST. PATIENT-LVL III: CPT | Mod: PBBFAC,,, | Performed by: REGISTERED NURSE

## 2024-05-23 RX ORDER — SULFAMETHOXAZOLE AND TRIMETHOPRIM 800; 160 MG/1; MG/1
1 TABLET ORAL 2 TIMES DAILY
Qty: 20 TABLET | Refills: 0 | Status: SHIPPED | OUTPATIENT
Start: 2024-05-23 | End: 2024-06-02

## 2024-05-23 NOTE — PROGRESS NOTES
SUBJECTIVE:     April Castillo  MRN:  [id number]  68 y.o. female    CHIEF COMPLAINT:     Leg infection    HPI:    April Castillo reports recurrent cellulitis to both lower legs for several days.  Seems to be a chronic intermittent issue, current tx with Aquaphor and Neosporin to the skin.  No blisters or drainage reported.  Does keep up her daily water intake, elevating.      Review of Systems   Constitutional:  Negative for chills and fever.   Skin:  Positive for itching and rash.   Neurological:  Positive for weakness. Negative for dizziness, tingling, tremors and headaches.       Review of patient's allergies indicates:  No Known Allergies      Patient Active Problem List   Diagnosis    Spinal stenosis    Gout with manifestations    Chronic pain    Bilateral pulmonary embolism    Essential hypertension    Morbid obesity with BMI of 50.0-59.9, adult    Hypertensive heart disease with heart failure    Postmenopausal    OAB (overactive bladder)       Current Outpatient Medications   Medication Instructions    b complex vitamins tablet 1 tablet, Oral, Daily    bumetanide (BUMEX) 2 mg, Oral, 2 times daily    cholecalciferol (vitamin D3) 5,000 Units, Oral, Daily    DULoxetine (CYMBALTA) 60 MG capsule TAKE 1 CAPSULE BY MOUTH EVERY DAY    ELIQUIS 5 mg Tab TAKE 2 TABLETS BY MOUTH TWICE DAILY UNTIL 12/11 THEN TAKE 1 TABLET TWICE DAILY FOR 6 MONTHS    FOLIC ACID/MULTIVIT-MIN/LUTEIN (CENTRUM SILVER ORAL) Oral    KLOR-CON 10 10 mEq TbSR 10 mEq, Oral, Daily    metoprolol tartrate (LOPRESSOR) 25 mg, Oral, 2 times daily    oxyMORphone (OPANA) 10 MG tablet Take 1 tablet every 12 hours by oral route.    potassium chloride SA (K-DUR,KLOR-CON) 20 MEQ tablet 20 mEq, Oral, Daily    tolterodine (DETROL LA) 4 mg, Oral, Daily    topiramate (TOPAMAX) 50 mg, Oral, 2 times daily    traMADoL (ULTRAM) 50 mg, Oral, 4 times daily    TURMERIC-TURMERIC ROOT EXTRACT ORAL Oral    walker (ULTRA-LIGHT ROLLATOR) Misc DX:  M48.00; G89.29    zonisamide (ZONEGRAN)  "100 MG Cap Take by mouth. 4 Capsule Oral Every evening         Past medical, surgical, family and social histories have been reviewed today.      OBJECTIVE:     Vitals:    05/23/24 1035   BP: 124/72   Pulse: 88   Resp: 18   Temp: 97.5 °F (36.4 °C)   TempSrc: Tympanic   SpO2: 97%   Weight: 129.7 kg (285 lb 15 oz)   Height: 5' 1" (1.549 m)       Physical Exam  Vitals reviewed.   Musculoskeletal:      Right lower leg: Edema present.      Left lower leg: Edema present.   Skin:     Findings: Rash present.      Comments: Cellulitis with chronic vascular changes noted to BLE   Neurological:      Mental Status: She is alert and oriented to person, place, and time.   Psychiatric:         Mood and Affect: Mood normal.         Behavior: Behavior normal.         Thought Content: Thought content normal.         Judgment: Judgment normal.         ASSESSMENT:     1. Cellulitis of both lower extremities  -     sulfamethoxazole-trimethoprim 800-160mg (BACTRIM DS) 800-160 mg Tab; Take 1 tablet by mouth 2 (two) times daily. for 10 days  Dispense: 20 tablet; Refill: 0      PLAN:     Antibiotic as ordered.  Monitor.  Due for annual wellness exam with PCP.  Contact office back if leg issue worsens or no improvement noted.        ZAC Khan  [facility]       20 minutes of total time spent on the encounter, which includes face to face time and non-face to face time preparing to see the patient.  This includes obtaining and/or reviewing separately obtained history, performing a medically appropriate examination and/or evaluation, and counseling and educating the patient/family/caregiver.  Includes documenting clinical information in the electronic or other health record, independently interpreting results (not separately reported) and communicating results to the patient/family/caregiver, with care coordination (not separately reported).  Medications, tests and/or procedures ordered as necessary along " with referring and communicating with other health professionals (when not separately reported).

## 2024-05-27 RX ORDER — TOLTERODINE 4 MG/1
4 CAPSULE, EXTENDED RELEASE ORAL DAILY
Qty: 15 CAPSULE | Refills: 0 | OUTPATIENT
Start: 2024-05-27

## 2024-05-30 ENCOUNTER — TELEPHONE (OUTPATIENT)
Dept: FAMILY MEDICINE | Facility: CLINIC | Age: 69
End: 2024-05-30
Payer: COMMERCIAL

## 2024-05-30 NOTE — TELEPHONE ENCOUNTER
----- Message from Joaquin Howard MA sent at 5/30/2024  2:08 PM CDT -----  Contact: April    ----- Message -----  From: Julee Mayers  Sent: 5/30/2024  12:02 PM CDT  To: Iván JONES Staff    April is calling to see if she needs to still be seen or can she just get a refill for the Sulfamethoxazole-trimethoprim 800-160mg (BACTRIM DS) 800-160 mg Tab, Please call her at [phone]. she stated that she bump her leg over the weekend on her walker.    [id number] - [place] - [address] AT AT [facility]  [address] [place] [address]  Phone: [phone] Fax: [phone]    Thanks  Td

## 2024-05-31 ENCOUNTER — TELEPHONE (OUTPATIENT)
Dept: FAMILY MEDICINE | Facility: CLINIC | Age: 69
End: 2024-05-31
Payer: COMMERCIAL

## 2024-05-31 NOTE — TELEPHONE ENCOUNTER
Pt stated she was seen last week and she had cellutis on the left leg now has it on the right and wants to see if you prescribe some antibotics cause she ran out .

## 2024-05-31 NOTE — TELEPHONE ENCOUNTER
Pt stated she only has 3 left and she also stated she doesn't like [facility] to [facility] and she just wants to get some more antibotics

## 2024-05-31 NOTE — TELEPHONE ENCOUNTER
I am full rest of AM, get off Noon on Fridays.  She can schedule with different provider at any location if PCP and Abbasi do not have openings today.

## 2024-05-31 NOTE — TELEPHONE ENCOUNTER
----- Message from Rosalia Duffy sent at 5/31/2024  7:04 AM CDT -----  Name of Who is Calling:pt           What is the request in detail:patient returning missed call regarding medication.           Can the clinic reply by [facility]:  no         What Number to Call Back if not in [facility]: [phone]

## 2024-05-31 NOTE — TELEPHONE ENCOUNTER
Not understanding how she is out of her antibiotic.  Bactrim ordered on 5/23 to take 1 in AM and 1 PM for 10 days.  Rx should end on 6/2/24.  Was she taking differently?    I would advise UC visit today or tomorrow to have re-checked.

## 2024-06-12 DIAGNOSIS — N32.81 OAB (OVERACTIVE BLADDER): ICD-10-CM

## 2024-06-12 NOTE — TELEPHONE ENCOUNTER
Refill Routing Note   Medication(s) are not appropriate for processing by [facility] for the following reason(s):        Required labs outdated    [facility] action(s):  Defer               Appointments  past 12m or future 3m with PCP    Date Provider   Last Visit   4/26/2023 Krista Casas MD   Next Visit   7/11/2024 Krista Casas MD   ED visits in past 90 days: 0        Note composed:10:31 AM 06/12/2024

## 2024-06-12 NOTE — TELEPHONE ENCOUNTER
No care due was identified.  [facility] Embedded Care Due Messages. Reference number: [id number].   6/12/2024 10:18:00 AM CDT

## 2024-06-15 RX ORDER — TOLTERODINE 4 MG/1
4 CAPSULE, EXTENDED RELEASE ORAL
Qty: 30 CAPSULE | Refills: 0 | Status: SHIPPED | OUTPATIENT
Start: 2024-06-15

## 2024-06-17 ENCOUNTER — TELEPHONE (OUTPATIENT)
Dept: FAMILY MEDICINE | Facility: CLINIC | Age: 69
End: 2024-06-17
Payer: COMMERCIAL

## 2024-06-26 DIAGNOSIS — I10 ESSENTIAL HYPERTENSION: ICD-10-CM

## 2024-07-08 ENCOUNTER — TELEPHONE (OUTPATIENT)
Dept: FAMILY MEDICINE | Facility: CLINIC | Age: 69
End: 2024-07-08
Payer: COMMERCIAL

## 2024-07-08 NOTE — TELEPHONE ENCOUNTER
----- Message from Lizzie Varner sent at 7/6/2024  9:19 AM CDT -----  Type:  Appointment Request     Name of Caller:BRUNO FLETCHER [[id number]]  When is the first available appointment?No access  Symptoms:appt  Would the patient rather a call back or a response via [facility]? call  Best Call Back Number:[phone]   Additional Information: Patient was in the hospital 2 weeks ago and would like a follow up appt with the provider for Monday 7/8. Please give the patient a call for availability with the provider.

## 2024-07-08 NOTE — TELEPHONE ENCOUNTER
Patient contacted regarding her previous message about wanting to schedule to be seen sooner for her hospital f/u. Patient appointment was scheduled sooner with AKI Abbasi, patient verbally understood the appointment information given.

## 2024-07-09 ENCOUNTER — OFFICE VISIT (OUTPATIENT)
Dept: FAMILY MEDICINE | Facility: CLINIC | Age: 69
End: 2024-07-09
Payer: COMMERCIAL

## 2024-07-09 ENCOUNTER — LAB VISIT (OUTPATIENT)
Dept: LAB | Facility: HOSPITAL | Age: 69
End: 2024-07-09
Attending: NURSE PRACTITIONER
Payer: COMMERCIAL

## 2024-07-09 VITALS
TEMPERATURE: 98 F | HEART RATE: 76 BPM | DIASTOLIC BLOOD PRESSURE: 80 MMHG | WEIGHT: 279.56 LBS | BODY MASS INDEX: 52.78 KG/M2 | HEIGHT: 61 IN | OXYGEN SATURATION: 98 % | SYSTOLIC BLOOD PRESSURE: 130 MMHG

## 2024-07-09 DIAGNOSIS — N32.81 OAB (OVERACTIVE BLADDER): ICD-10-CM

## 2024-07-09 DIAGNOSIS — Z00.00 WELLNESS EXAMINATION: ICD-10-CM

## 2024-07-09 DIAGNOSIS — E66.01 MORBID OBESITY WITH BMI OF 50.0-59.9, ADULT: ICD-10-CM

## 2024-07-09 DIAGNOSIS — Z12.39 ENCOUNTER FOR SCREENING FOR MALIGNANT NEOPLASM OF BREAST, UNSPECIFIED SCREENING MODALITY: ICD-10-CM

## 2024-07-09 DIAGNOSIS — I11.0 HYPERTENSIVE HEART DISEASE WITH HEART FAILURE: ICD-10-CM

## 2024-07-09 DIAGNOSIS — Z00.00 WELLNESS EXAMINATION: Primary | ICD-10-CM

## 2024-07-09 LAB
ALBUMIN SERPL BCP-MCNC: 3.4 G/DL (ref 3.5–5.2)
ALP SERPL-CCNC: 81 U/L (ref 55–135)
ALT SERPL W/O P-5'-P-CCNC: 14 U/L (ref 10–44)
ANION GAP SERPL CALC-SCNC: 9 MMOL/L (ref 8–16)
AST SERPL-CCNC: 16 U/L (ref 10–40)
BILIRUB SERPL-MCNC: 0.3 MG/DL (ref 0.1–1)
BUN SERPL-MCNC: 12 MG/DL (ref 8–23)
CALCIUM SERPL-MCNC: 9.4 MG/DL (ref 8.7–10.5)
CHLORIDE SERPL-SCNC: 107 MMOL/L (ref 95–110)
CO2 SERPL-SCNC: 22 MMOL/L (ref 23–29)
CREAT SERPL-MCNC: 0.9 MG/DL (ref 0.5–1.4)
EST. GFR  (NO RACE VARIABLE): >60 ML/MIN/1.73 M^2
GLUCOSE SERPL-MCNC: 75 MG/DL (ref 70–110)
POTASSIUM SERPL-SCNC: 4.2 MMOL/L (ref 3.5–5.1)
PROT SERPL-MCNC: 7.5 G/DL (ref 6–8.4)
SODIUM SERPL-SCNC: 138 MMOL/L (ref 136–145)

## 2024-07-09 PROCEDURE — 99397 PER PM REEVAL EST PAT 65+ YR: CPT | Mod: S$GLB,,, | Performed by: NURSE PRACTITIONER

## 2024-07-09 PROCEDURE — 3079F DIAST BP 80-89 MM HG: CPT | Mod: CPTII,S$GLB,, | Performed by: NURSE PRACTITIONER

## 2024-07-09 PROCEDURE — 99999 PR PBB SHADOW E&M-EST. PATIENT-LVL V: CPT | Mod: PBBFAC,,, | Performed by: NURSE PRACTITIONER

## 2024-07-09 PROCEDURE — 80053 COMPREHEN METABOLIC PANEL: CPT | Performed by: NURSE PRACTITIONER

## 2024-07-09 PROCEDURE — 3075F SYST BP GE 130 - 139MM HG: CPT | Mod: CPTII,S$GLB,, | Performed by: NURSE PRACTITIONER

## 2024-07-09 PROCEDURE — 1159F MED LIST DOCD IN RCRD: CPT | Mod: CPTII,S$GLB,, | Performed by: NURSE PRACTITIONER

## 2024-07-09 PROCEDURE — 36415 COLL VENOUS BLD VENIPUNCTURE: CPT | Mod: PO | Performed by: NURSE PRACTITIONER

## 2024-07-09 PROCEDURE — 1160F RVW MEDS BY RX/DR IN RCRD: CPT | Mod: CPTII,S$GLB,, | Performed by: NURSE PRACTITIONER

## 2024-07-09 PROCEDURE — 3008F BODY MASS INDEX DOCD: CPT | Mod: CPTII,S$GLB,, | Performed by: NURSE PRACTITIONER

## 2024-07-09 RX ORDER — LINEZOLID 600 MG/1
600 TABLET, FILM COATED ORAL EVERY 12 HOURS
COMMUNITY
Start: 2024-06-06

## 2024-07-09 RX ORDER — CIPROFLOXACIN 500 MG/1
500 TABLET ORAL 2 TIMES DAILY
COMMUNITY
Start: 2024-06-06

## 2024-07-09 NOTE — TELEPHONE ENCOUNTER
Refill Routing Note   Medication(s) are not appropriate for processing by [facility] for the following reason(s):        Required labs outdated    [facility] action(s):  Defer             Appointments  past 12m or future 3m with PCP    Date Provider   Last Visit   4/26/2023 Krista Casas MD   Next Visit   Visit date not found Krista Casas MD   ED visits in past 90 days: 0        Note composed:6:30 PM 07/09/2024

## 2024-07-09 NOTE — TELEPHONE ENCOUNTER
No care due was identified.  [facility] Embedded Care Due Messages. Reference number: [id number].   7/09/2024 11:32:22 AM CDT

## 2024-07-09 NOTE — PROGRESS NOTES
"April Castillo  2024  [id number]    Krista Casas MD  Patient Care Team:  Krista Casas MD as PCP - General (Family Medicine)          Visit Type:a scheduled routine follow-up visit    Chief Complaint:  Chief Complaint   Patient presents with    hospital f/u       History of Present Illness:    70 yo female presents with co abnormal kidney function. Reports she was evaluated by her Home health nurse and was told she had chronic kidney disease but was never told she had this diagnosis. Today she is requesting repeat labs for confirmation.   No other complaints experienced at this time                                                     History:  Past Medical History:   Diagnosis Date    Arthritis     Chronic pain     Gout with manifestations     History of abnormal cervical Pap smear     Repeat pap smear okay    Morbid obesity with BMI of 60.0-69.9, adult     Spinal stenosis      Past Surgical History:   Procedure Laterality Date    BACK SURGERY      bilateral tubal ligation       SECTION      Pain pump insertion (morphine)       Family History   Problem Relation Name Age of Onset    Hypertension Mother      Diabetes Father      Diabetes Sister      Multiple sclerosis Sister      Diabetes Mellitus Sister          Borderline    No Known Problems Daughter      Cancer Maternal Aunt          "blood cancer"    Stroke Neg Hx      Heart disease Neg Hx       Social History     Socioeconomic History    Marital status: Single    Number of children: 1   Occupational History    Occupation: Retired   Tobacco Use    Smoking status: Never    Smokeless tobacco: Never   Substance and Sexual Activity    Alcohol use: No    Drug use: No    Sexual activity: Never     Partners: Male     Birth control/protection: Surgical   Social History Narrative    She wears seatbelt.     Social Determinants of Health     Physical Activity: Inactive (2023)    Exercise Vital Sign     Days of Exercise per Week: 0 days     Minutes of " Exercise per Session: 0 min     Patient Active Problem List   Diagnosis    Spinal stenosis    Gout with manifestations    Chronic pain    Bilateral pulmonary embolism    Essential hypertension    Morbid obesity with BMI of 50.0-59.9, adult    Hypertensive heart disease with heart failure    Postmenopausal    OAB (overactive bladder)     Review of patient's allergies indicates:  No Known Allergies    The following were reviewed at this visit: active problem list, medication list, allergies, family history, social history, and health maintenance.    Medications:  Current Outpatient Medications on File Prior to Visit   Medication Sig Dispense Refill    b complex vitamins tablet Take 1 tablet by mouth once daily.      bumetanide (BUMEX) 2 MG tablet Take 2 mg by mouth 2 (two) times daily.      cholecalciferol, vitamin D3, 125 mcg (5,000 unit) Tab Take 5,000 Units by mouth once daily.      ciprofloxacin HCl (CIPRO) 500 MG tablet Take 500 mg by mouth 2 (two) times daily.      DULoxetine (CYMBALTA) 60 MG capsule TAKE 1 CAPSULE BY MOUTH EVERY DAY 90 capsule 0    ELIQUIS 5 mg Tab TAKE 2 TABLETS BY MOUTH TWICE DAILY UNTIL 12/11 THEN TAKE 1 TABLET TWICE DAILY FOR 6 MONTHS 60 tablet 0    FOLIC ACID/MULTIVIT-MIN/LUTEIN (CENTRUM SILVER ORAL) Take by mouth.      KLOR-CON 10 10 mEq TbSR Take 10 mEq by mouth once daily.      linezolid (ZYVOX) 600 mg Tab Take 600 mg by mouth every 12 (twelve) hours.      metoprolol tartrate (LOPRESSOR) 25 MG tablet Take 25 mg by mouth 2 (two) times daily.  3    oxyMORphone (OPANA) 10 MG tablet Take 1 tablet every 12 hours by oral route.      potassium chloride SA (K-DUR,KLOR-CON) 20 MEQ tablet Take 1 tablet (20 mEq total) by mouth once daily. 30 tablet 5    tolterodine (DETROL LA) 4 MG 24 hr capsule TAKE 1 CAPSULE BY MOUTH EVERY DAY 30 capsule 0    topiramate (TOPAMAX) 50 MG tablet Take 50 mg by mouth 2 (two) times daily.      tramadol (ULTRAM) 50 mg tablet Take 50 mg by mouth 4 (four) times daily.  2     TURMERIC-TURMERIC ROOT EXTRACT ORAL Take by mouth.      walker (ULTRA-LIGHT ROLLATOR) Misc DX:  M48.00; G89.29 1 each 0    zonisamide (ZONEGRAN) 100 MG Cap Take by mouth. 4 Capsule Oral Every evening       No current facility-administered medications on file prior to visit.       Medications have been reviewed and reconciled with patient at this visit.  Barriers to medications reviewed with patient.    Adverse reactions to current medications reviewed with patient..    Over the counter medications reviewed and reconciled with patient.    Exam:  Wt Readings from Last 3 Encounters:   07/09/24 126.8 kg (279 lb 8.7 oz)   05/23/24 129.7 kg (285 lb 15 oz)   12/07/23 127.9 kg (281 lb 13.7 oz)     Temp Readings from Last 3 Encounters:   07/09/24 97.7 °F (36.5 °C) (Temporal)   05/23/24 97.5 °F (36.4 °C) (Tympanic)   12/07/23 97 °F (36.1 °C)     BP Readings from Last 3 Encounters:   07/09/24 130/80   05/23/24 124/72   12/07/23 128/74     Pulse Readings from Last 3 Encounters:   07/09/24 76   05/23/24 88   12/07/23 83     Body mass index is 52.82 kg/m².      Review of Systems   Constitutional:  Negative for fever.   Respiratory:  Negative for cough, shortness of breath and wheezing.    Cardiovascular:  Negative for chest pain and palpitations.   Gastrointestinal:  Negative for nausea.   Neurological:  Negative for speech change, weakness and headaches.   All other systems reviewed and are negative.    Physical Exam  Vitals and nursing note reviewed.   Constitutional:       Appearance: Normal appearance. She is obese.   HENT:      Head: Normocephalic and atraumatic.      Right Ear: Tympanic membrane, ear canal and external ear normal.      Left Ear: Tympanic membrane, ear canal and external ear normal.      Nose: Nose normal.      Mouth/Throat:      Mouth: Mucous membranes are moist.      Pharynx: Oropharynx is clear.   Eyes:      Extraocular Movements: Extraocular movements intact.      Conjunctiva/sclera: Conjunctivae  normal.      Pupils: Pupils are equal, round, and reactive to light.   Cardiovascular:      Rate and Rhythm: Normal rate and regular rhythm.      Pulses: Normal pulses.      Heart sounds: Normal heart sounds.   Pulmonary:      Effort: Pulmonary effort is normal.      Breath sounds: Normal breath sounds.   Abdominal:      General: Bowel sounds are normal.      Palpations: Abdomen is soft.   Musculoskeletal:         General: Normal range of motion.      Cervical back: Normal range of motion and neck supple.   Skin:     General: Skin is warm and dry.      Capillary Refill: Capillary refill takes less than 2 seconds.   Neurological:      General: No focal deficit present.      Mental Status: She is alert and oriented to person, place, and time.   Psychiatric:         Mood and Affect: Mood normal.         Behavior: Behavior normal.         Thought Content: Thought content normal.         Judgment: Judgment normal.         Laboratory Reviewed ({Yes)  Lab Results   Component Value Date    WBC 6.04 03/16/2021    HGB 11.5 (L) 03/16/2021    HCT 38.6 03/16/2021     03/16/2021    CHOL 177 09/01/2016    TRIG 101 09/01/2016    HDL 55 09/01/2016    ALT 14 07/09/2024    AST 16 07/09/2024     07/09/2024    K 4.2 07/09/2024     07/09/2024    CREATININE 0.9 07/09/2024    BUN 12 07/09/2024    CO2 22 (L) 07/09/2024    TSH 1.783 03/16/2021    HGBA1C 5.2 03/16/2021       April was seen today for hospital f/u.    Diagnoses and all orders for this visit:    Wellness examination  -     COMPREHENSIVE METABOLIC PANEL; Future    Encounter for screening for malignant neoplasm of breast, unspecified screening modality  -     Mammo Digital Screening Bilat w/ Jose; Future    Morbid obesity with BMI of 50.0-59.9, adult    Hypertensive heart disease with heart failure        Plan  Lab  The current medical regimen is effective;  continue present plan and medications.   mammogram        Care Plan/Goals: Reviewed    Goals    None        April was seen today for hospital f/u.    Diagnoses and all orders for this visit:    Wellness examination  -     COMPREHENSIVE METABOLIC PANEL; Future    Encounter for screening for malignant neoplasm of breast, unspecified screening modality  -     Mammo Digital Screening Bilat w/ Jose; Future    Morbid obesity with BMI of 50.0-59.9, adult    Hypertensive heart disease with heart failure       Follow up: Follow up for with  for 6 month follow up.    After visit summary was printed and given to patient upon discharge today.  Patient goals and care plan are included in After Visit Summary.

## 2024-07-12 RX ORDER — TOLTERODINE 4 MG/1
4 CAPSULE, EXTENDED RELEASE ORAL
Qty: 90 CAPSULE | Refills: 0 | Status: SHIPPED | OUTPATIENT
Start: 2024-07-12

## 2024-08-16 DIAGNOSIS — N32.81 OAB (OVERACTIVE BLADDER): ICD-10-CM

## 2024-08-16 NOTE — TELEPHONE ENCOUNTER
Care Due:                  Date            Visit Type   Department     Provider  --------------------------------------------------------------------------------                                EP -                              PRIMARY      [facility]  Last Visit: 04-      CARE ([facility])   MEDICINE       Krista Casas  Next Visit: None Scheduled  None         None Found                                                            Last  Test          Frequency    Reason                     Performed    Due Date  --------------------------------------------------------------------------------    Office Visit  15 months..  tolterodine..............  04- 07-    [facility] Embedded Care Due Messages. Reference number: [id number].   8/16/2024 3:21:19 PM CDT

## 2024-08-20 RX ORDER — TOLTERODINE 4 MG/1
4 CAPSULE, EXTENDED RELEASE ORAL
Qty: 90 CAPSULE | Refills: 0 | Status: SHIPPED | OUTPATIENT
Start: 2024-08-20

## 2024-08-21 ENCOUNTER — PATIENT MESSAGE (OUTPATIENT)
Dept: INTERNAL MEDICINE | Facility: CLINIC | Age: 69
End: 2024-08-21
Payer: COMMERCIAL

## 2024-08-23 ENCOUNTER — TELEPHONE (OUTPATIENT)
Dept: FAMILY MEDICINE | Facility: CLINIC | Age: 69
End: 2024-08-23
Payer: COMMERCIAL

## 2024-08-23 NOTE — TELEPHONE ENCOUNTER
Pt called in stating that she just finished Ciprofloxacin 500 mg and is having symptoms of a yeast infection. Pt is requesting something be called in to her pharmacy. Please advise.

## 2024-08-27 ENCOUNTER — OFFICE VISIT (OUTPATIENT)
Dept: FAMILY MEDICINE | Facility: CLINIC | Age: 69
End: 2024-08-27
Attending: FAMILY MEDICINE
Payer: COMMERCIAL

## 2024-08-27 VITALS
DIASTOLIC BLOOD PRESSURE: 78 MMHG | BODY MASS INDEX: 51.78 KG/M2 | HEIGHT: 61 IN | TEMPERATURE: 99 F | OXYGEN SATURATION: 95 % | RESPIRATION RATE: 18 BRPM | WEIGHT: 274.25 LBS | HEART RATE: 82 BPM | SYSTOLIC BLOOD PRESSURE: 126 MMHG

## 2024-08-27 DIAGNOSIS — I10 ESSENTIAL HYPERTENSION: ICD-10-CM

## 2024-08-27 DIAGNOSIS — I50.9 CONGESTIVE HEART FAILURE, UNSPECIFIED HF CHRONICITY, UNSPECIFIED HEART FAILURE TYPE: ICD-10-CM

## 2024-08-27 DIAGNOSIS — Z12.11 COLON CANCER SCREENING: ICD-10-CM

## 2024-08-27 DIAGNOSIS — Z78.0 POSTMENOPAUSAL: ICD-10-CM

## 2024-08-27 DIAGNOSIS — E66.01 MORBID OBESITY WITH BMI OF 50.0-59.9, ADULT: ICD-10-CM

## 2024-08-27 DIAGNOSIS — N39.0 URINARY TRACT INFECTION WITHOUT HEMATURIA, SITE UNSPECIFIED: ICD-10-CM

## 2024-08-27 DIAGNOSIS — N32.81 OAB (OVERACTIVE BLADDER): ICD-10-CM

## 2024-08-27 DIAGNOSIS — R35.0 URINE FREQUENCY: ICD-10-CM

## 2024-08-27 LAB
BILIRUB SERPL-MCNC: NEGATIVE MG/DL
BLOOD URINE, POC: NORMAL
CLARITY, POC UA: NORMAL
COLOR, POC UA: NORMAL
GLUCOSE UR QL STRIP: NEGATIVE
KETONES UR QL STRIP: NORMAL
LEUKOCYTE ESTERASE URINE, POC: NORMAL
NITRITE, POC UA: POSITIVE
PH, POC UA: 6
PROTEIN, POC: NORMAL
SPECIFIC GRAVITY, POC UA: 1
UROBILINOGEN, POC UA: 0.2

## 2024-08-27 PROCEDURE — 99999 PR PBB SHADOW E&M-EST. PATIENT-LVL V: CPT | Mod: PBBFAC,,, | Performed by: FAMILY MEDICINE

## 2024-08-27 RX ORDER — SOLIFENACIN SUCCINATE 5 MG/1
5 TABLET, FILM COATED ORAL DAILY
Qty: 90 TABLET | Refills: 1 | Status: SHIPPED | OUTPATIENT
Start: 2024-08-27

## 2024-08-27 RX ORDER — CIPROFLOXACIN 500 MG/1
500 TABLET ORAL 2 TIMES DAILY
Qty: 14 TABLET | Refills: 0 | Status: SHIPPED | OUTPATIENT
Start: 2024-08-27 | End: 2024-09-03

## 2024-08-27 NOTE — PROGRESS NOTES
"April Castillo    Chief Complaint   Patient presents with    Right leg pain       History of Present Illness:   Ms. Castillo comes in today for leg follow-up.  She states she is not fasting today.  Per chart review, she was hospitalized at [facility] on June 2, 2024 through June 7, 2024 for right cellulitis.  She asks if her right leg "is well. "    She also states she desires Gemtesa for OAB over Detrol LA. However per chart review, on May 17, 2023 she told me Gemtesa did not work. She states she has frequent urination at night and urine incontinence with standing.  She states she thinks she has a UTI as she reports having back pain this week.      She states she follows with spine doctor for spinal stenosis.  She states she follows with Dr. Simone Luu, cardiologist, for hypertensive heart disease with heart failure but desires to see [facility] cardiologist.    Otherwise, she denies having fever, chills, fatigue, appetite changes; shortness of breath, cough, wheezing; chest pain, palpitations, leg swelling; abdominal pain, nausea, vomiting, diarrhea, constipation; other unusual urinary symptoms; polydipsia, polyphagia, polyuria, hot or cold intolerance; numbness, acute visual changes, headache; anxiety, depression, homicidal or suicidal thoughts.           Urine dip stick ordered by me today - trace ketones, 3+ blood, trace protein, nitrite positive, 3+ leukocytes, glucose negative, dark luci color, specific gravity 1.000, pH 6.        Labs:                  WBC                      6.04                03/16/2021                 HGB                      11.5 (L)            03/16/2021                 HCT                      38.6                03/16/2021                 PLT                      317                 03/16/2021                 CHOL                     177                 09/01/2016                 TRIG                     101                 09/01/2016                 HDL                     "  55                  09/01/2016                 ALT                      14                  07/09/2024                 AST                      16                  07/09/2024                 NA                       138                 07/09/2024                 K                        4.2                 07/09/2024                 CL                       107                 07/09/2024                 CREATININE               0.9                 07/09/2024                 BUN                      12                  07/09/2024                 CO2                      22 (L)              07/09/2024                 TSH                      1.783               03/16/2021                 HGBA1C                   5.2                 03/16/2021                 LDLCALC                  101.8               09/01/2016                  Current Outpatient Medications   Medication Sig    apixaban (ELIQUIS) 5 mg Tab TAKE 1 TABLET ORALLY 2 TIMES A DAY MUST SEE MD FOR FURTHER REFILLS    b complex vitamins tablet Take 1 tablet by mouth once daily.    cholecalciferol, vitamin D3, 125 mcg (5,000 unit) Tab Take 5,000 Units by mouth once daily.    DULoxetine (CYMBALTA) 60 MG capsule TAKE 1 CAPSULE BY MOUTH EVERY DAY    metoprolol tartrate (LOPRESSOR) 25 MG tablet Take 25 mg by mouth 2 (two) times daily.    tolterodine (DETROL LA) 4 MG 24 hr capsule TAKE 1 CAPSULE BY MOUTH EVERY DAY    topiramate (TOPAMAX) 50 MG tablet Take 50 mg by mouth 2 (two) times daily.    tramadol (ULTRAM) 50 mg tablet Take 50 mg by mouth 4 (four) times daily.    TURMERIC-TURMERIC ROOT EXTRACT ORAL Take by mouth.    walker (ULTRA-LIGHT ROLLATOR) Misc DX:  M48.00; G89.29    zonisamide (ZONEGRAN) 100 MG Cap Take by mouth. 4 Capsule Oral Every evening         Review of Systems   Constitutional:  Negative for activity change, appetite change, chills, fatigue and fever.   Respiratory:  Negative for cough, shortness of breath and wheezing.    Cardiovascular:   Negative for chest pain, palpitations and leg swelling.        See history of present illness.   Gastrointestinal:  Negative for abdominal pain, constipation, diarrhea, nausea and vomiting.   Endocrine: Negative for cold intolerance, heat intolerance, polydipsia, polyphagia and polyuria.   Genitourinary:  Positive for frequency. Negative for difficulty urinating.   Musculoskeletal:  Positive for back pain.   Skin:  Positive for wound.   Neurological:  Negative for numbness and headaches.   Psychiatric/Behavioral:  Negative for dysphoric mood and suicidal ideas. The patient is not nervous/anxious.         Negative for homicidal ideas.       Objective:  Physical Exam  Vitals reviewed.   Constitutional:       General: She is not in acute distress.     Appearance: Normal appearance. She is obese. She is not ill-appearing, toxic-appearing or diaphoretic.      Comments: Pleasant.   Cardiovascular:      Rate and Rhythm: Normal rate and regular rhythm.      Pulses: Normal pulses.      Heart sounds: No murmur heard.  Pulmonary:      Effort: Pulmonary effort is normal. No respiratory distress.      Breath sounds: Normal breath sounds. No wheezing.   Abdominal:      General: Bowel sounds are normal. There is no distension.      Palpations: Abdomen is soft. There is no mass.      Tenderness: There is no abdominal tenderness. There is no right CVA tenderness, left CVA tenderness, guarding or rebound.   Musculoskeletal:         General: No swelling or tenderness. Normal range of motion.      Cervical back: Normal range of motion and neck supple. No tenderness.      Comments: She is ambulatory without problems but using rolling walker. Non tender low back with full range of motion noted.   Lymphadenopathy:      Cervical: No cervical adenopathy.   Skin:     General: Skin is warm.      Comments: No cellulitis or blisters noted at right lower leg.   Neurological:      General: No focal deficit present.      Mental Status: She is  alert and oriented to person, place, and time.   Psychiatric:         Mood and Affect: Mood normal.         Behavior: Behavior normal.         Thought Content: Thought content normal.         Judgment: Judgment normal.         ASSESSMENT:  1. Urinary tract infection without hematuria, site unspecified    2. Urine frequency    3. OAB (overactive bladder)    4. Congestive heart failure, unspecified HF chronicity, unspecified heart failure type    5. Essential hypertension    6. Morbid obesity with BMI of 50.0-59.9, adult    7. Postmenopausal    8. Colon cancer screening        PLAN:  April was seen today for right leg pain.    Diagnoses and all orders for this visit:    Urinary tract infection without hematuria, site unspecified  -     ciprofloxacin HCl (CIPRO) 500 MG tablet; Take 1 tablet (500 mg total) by mouth 2 (two) times daily. for 7 days    Urine frequency  -     POCT URINE DIPSTICK WITHOUT MICROSCOPE    OAB (overactive bladder)  -     solifenacin (VESICARE) 5 MG tablet; Take 1 tablet (5 mg total) by mouth once daily.    Congestive heart failure, unspecified HF chronicity, unspecified heart failure type  -     Ambulatory referral/consult to Cardiology; Future    Essential hypertension  -     Lipid Panel; Future  -     CBC Auto Differential; Future  -     TSH; Future  -     Hemoglobin A1C; Future    Morbid obesity with BMI of 50.0-59.9, adult    Postmenopausal  -     DXA Bone Density Axial Skeleton 1 or more sites; Future    Colon cancer screening  -     Ambulatory referral/consult to Endo Procedure ; Future      Patient advised to call for results.  Continue current medications, follow low sodium, low cholesterol, low carb diet, daily walks.  Cipro 500 mg twice daily for 7 days; medication precautions discussed with patient.  Stop Detrol LA; try Vesicare 5 mg daily for OAB; medication precautions discussed with patient.  Keep follow up with specialists.  Flu shot this fall.  Follow up in about 6 months  (around 2/27/2025) for hypertension follow up. But, see me sooner if no improvement or worsening symptoms noted.    40 minutes of total time spent on the encounter, which includes face to face time and non-face to face time preparing to see the patient (eg, review of tests), Obtaining and/or reviewing separately obtained history, Documenting clinical information in the electronic or other health record, Independently interpreting results (not separately reported) and communicating results to the patient/family/caregiver, or Care coordination (not separately reported).      This note is  generated with speech recognition software and is subject to transcription error and sound alike phrases that may be missed by proofreading.

## 2024-08-28 DIAGNOSIS — I10 ESSENTIAL HYPERTENSION: Primary | ICD-10-CM

## 2024-08-28 DIAGNOSIS — I11.0 HYPERTENSIVE HEART DISEASE WITH HEART FAILURE: ICD-10-CM

## 2024-09-02 DIAGNOSIS — Z76.0 MEDICATION REFILL: ICD-10-CM

## 2024-09-02 RX ORDER — DULOXETIN HYDROCHLORIDE 60 MG/1
CAPSULE, DELAYED RELEASE ORAL
Qty: 90 CAPSULE | Refills: 3 | Status: SHIPPED | OUTPATIENT
Start: 2024-09-02

## 2024-09-02 NOTE — TELEPHONE ENCOUNTER
No care due was identified.  [facility] Embedded Care Due Messages. Reference number: [id number].   9/02/2024 12:31:42 AM CDT

## 2024-09-02 NOTE — TELEPHONE ENCOUNTER
Refill Decision Note   April Castillo  is requesting a refill authorization.  Brief Assessment and Rationale for Refill:  Approve     Medication Therapy Plan:         Comments:     Note composed:10:48 AM 09/02/2024

## 2024-09-03 ENCOUNTER — DOCUMENT SCAN (OUTPATIENT)
Dept: HOME HEALTH SERVICES | Facility: HOSPITAL | Age: 69
End: 2024-09-03
Payer: COMMERCIAL

## 2024-09-09 ENCOUNTER — PATIENT MESSAGE (OUTPATIENT)
Dept: INTERNAL MEDICINE | Facility: CLINIC | Age: 69
End: 2024-09-09
Payer: COMMERCIAL

## 2024-09-11 PROBLEM — I50.9 CONGESTIVE HEART FAILURE: Status: ACTIVE | Noted: 2024-09-11

## 2024-09-23 ENCOUNTER — TELEPHONE (OUTPATIENT)
Dept: FAMILY MEDICINE | Facility: CLINIC | Age: 69
End: 2024-09-23
Payer: COMMERCIAL

## 2024-09-23 NOTE — TELEPHONE ENCOUNTER
Pt called in stating she has been having UTI sx as well as back pain. Pt states she has seen you for UTI and is having all of the same sx. Pt requesting rx be sent into the [facility] for her. Please advise. Thanks

## 2024-09-23 NOTE — TELEPHONE ENCOUNTER
----- Message from Reediban Francisco sent at 9/23/2024  8:28 AM CDT -----  Type:  Needs Medical Advice    Who Called: April   Symptoms (please be specific):    How long has patient had these symptoms:    Pharmacy name and phone #:    [id number] [place] - [address] AT AT [facility]  [address] [place] [address]  Phone: [phone] Fax: [phone]      Would the patient rather a call back or a response via [facility]?   Best Call Back Number:  [phone]  Additional Information: Patient would like to know if she can get medication sent to pharmacy for a UTI

## 2024-10-11 ENCOUNTER — PATIENT MESSAGE (OUTPATIENT)
Dept: INTERNAL MEDICINE | Facility: CLINIC | Age: 69
End: 2024-10-11
Payer: COMMERCIAL

## 2024-10-18 ENCOUNTER — TELEPHONE (OUTPATIENT)
Dept: FAMILY MEDICINE | Facility: CLINIC | Age: 69
End: 2024-10-18
Payer: COMMERCIAL

## 2024-10-18 NOTE — TELEPHONE ENCOUNTER
----- Message from Disha sent at 10/18/2024  3:23 PM CDT -----  Contact: patient  .[email or web address]     Patient is calling for Medical Advice regarding:Rx: CEPHALEXIN 500 MG . Patient state it is giving her Diarrhea . Patient also was in the hospital due to leg cellulitis    How long has patient had these symptoms:    Pharmacy name and phone#:  [id number] [place] - [address] AT AT [facility]          Patient wants a call back or thru [facility]:    Comments:    Please advise patient replies from provider may take up to 48 hours.

## 2024-10-18 NOTE — TELEPHONE ENCOUNTER
Patient called in stating she was recently hospitalized for cellulitis of her leg. She says she was prescribed an antibiotic CEPHALEXIN 500 MG, she says it's giving her diarrhea. She wants to know if you can change her antibiotic for her. She also stated that her kidney count is at 30. She wants to know if this could be from her morphine pain pump?

## 2024-10-18 NOTE — TELEPHONE ENCOUNTER
Patient contacted and informed of the information per Dr. Casas.     I'm not sure what she means by kidney count is 30; however, I'm sure the hospital provider is aware and prescribed appropriate medication dose. I'm sorry that cephalexin causes diarrhea for her; however, I recommend she continue it until completed for treatment of cellulitis. If needed, she can Imodium for diarrhea.  Thanks.         Patient verbally understood the information given.

## 2024-10-18 NOTE — TELEPHONE ENCOUNTER
I'm not sure what she means by kidney count is 30; however, I'm sure the hospital provider is aware and prescribed appropriate medication dose. I'm sorry that cephalexin causes diarrhea for her; however, I recommend she continue it until completed for treatment of cellulitis. If needed, she can Imodium for diarrhea.  Thanks.

## 2024-10-21 ENCOUNTER — TELEPHONE (OUTPATIENT)
Dept: FAMILY MEDICINE | Facility: CLINIC | Age: 69
End: 2024-10-21
Payer: COMMERCIAL

## 2024-10-21 NOTE — TELEPHONE ENCOUNTER
----- Message from Nurse Fan sent at 10/21/2024  9:39 AM CDT -----  Contact: [phone] patient    ----- Message -----  From: Disha Paredes  Sent: 10/18/2024   3:30 PM CDT  To: Nimisha Villarreal    2TESTRESULTS    Type: Test Results    What test was performed?LABS    Who ordered the test? Courtesy nathaly    When and where were the test performed? 07/9/2024 [facility]     Would you like a call back and or thru [facility]:call    Comments:

## 2024-10-30 ENCOUNTER — PATIENT MESSAGE (OUTPATIENT)
Dept: INTERNAL MEDICINE | Facility: CLINIC | Age: 69
End: 2024-10-30
Payer: COMMERCIAL

## 2024-11-20 DIAGNOSIS — N39.0 URINARY TRACT INFECTION WITHOUT HEMATURIA, SITE UNSPECIFIED: ICD-10-CM

## 2024-11-20 DIAGNOSIS — M54.50 LOW BACK PAIN WITHOUT SCIATICA, UNSPECIFIED BACK PAIN LATERALITY, UNSPECIFIED CHRONICITY: Primary | ICD-10-CM

## 2024-11-20 RX ORDER — CIPROFLOXACIN 500 MG/1
500 TABLET ORAL 2 TIMES DAILY
Qty: 14 TABLET | Refills: 0 | OUTPATIENT
Start: 2024-11-20 | End: 2024-11-27

## 2024-11-20 NOTE — TELEPHONE ENCOUNTER
Patient called in c/o lower back pain for the last 2-3 days. Pt thinks she is having onset of UTI. She denied pain with urination, frequency, and difficulty urinating. She is concerned because her last UTI started with lower back pain. She states she was previously advised to not take Bactrim because it makes her itchy. Pt states her home health nurse just left her home and offered to drop off urine specimen if order is placed. Pt does not currently have transportation. Please advise

## 2024-11-20 NOTE — TELEPHONE ENCOUNTER
Advised pt via telephone that urine culture order was placed. Asked pt for  nurse contact info. Spoke with Astrid [phone] to let her know order was placed. Faxed urine order to fax number provided by Astrid with [facility]. Fax ([phone])

## 2024-11-20 NOTE — TELEPHONE ENCOUNTER
Refill Routing Note   Medication(s) are not appropriate for processing by [facility] for the following reason(s):        Outside of protocol    [facility] action(s):  Route               Appointments  past 12m or future 3m with PCP    Date Provider   Last Visit   8/27/2024 Krista Casas MD   Next Visit   2/27/2025 Krista Casas MD   ED visits in past 90 days: 0        Note composed:1:41 PM 11/20/2024

## 2024-11-20 NOTE — TELEPHONE ENCOUNTER
I have put the following orders and/or medications to this note.  Please advise pt and assist. No antibiotic pending urine culture result. Advised patient to call for result.    Orders Placed This Encounter   Procedures    Urine culture     Standing Status:   Future     Standing Expiration Date:   1/19/2026     Order Specific Question:   Send normal result to authorizing provider's In Basket if patient is active on MyChart:     Answer:   Yes

## 2024-11-20 NOTE — TELEPHONE ENCOUNTER
----- Message from Shira sent at 11/20/2024 12:02 PM CST -----  Contact: April  .Type:  Needs Medical Advice    Who Called:  April   Symptoms (please be specific):   back pain, possible UTI     How long has patient had these symptoms:   a couple of days     Pharmacy name and phone #:  [id number] - [place] - [address] AT [facility] [address]  Phone: [phone] Fax: [phone]    Would the patient rather a call back or a response via [facility]?  Call back   Best Call Back Number:  [phone] (home)     Additional Information:  Pt is calling in regard to getting a call back to discuss getting antibiotics called in due to she is experiencing back pain due to a possible UTI.  Pt states the pharmacy sent over an request refill she also states that she would like to come in but doesn't have transportation to get there     Pt states at last hospital stay back in June she was informed not to take bactrim      Thanks

## 2024-11-30 RX ORDER — APIXABAN 5 MG/1
TABLET, FILM COATED ORAL
Qty: 60 TABLET | Refills: 0 | Status: SHIPPED | OUTPATIENT
Start: 2024-11-30

## 2024-11-30 NOTE — TELEPHONE ENCOUNTER
I am honoring 1 refill for Eliquis as I don't see information/records as to how long she is supposed to be taking Eliquis.  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 However, let's check with patient to see if she follows with lung or heart doctor.  If so, with whom (names).

## 2024-12-02 ENCOUNTER — TELEPHONE (OUTPATIENT)
Dept: FAMILY MEDICINE | Facility: CLINIC | Age: 69
End: 2024-12-02
Payer: COMMERCIAL

## 2024-12-02 DIAGNOSIS — N39.0 URINARY TRACT INFECTION WITHOUT HEMATURIA, SITE UNSPECIFIED: Primary | ICD-10-CM

## 2024-12-02 RX ORDER — LEVOFLOXACIN 500 MG/1
500 TABLET, FILM COATED ORAL DAILY
Qty: 3 TABLET | Refills: 0 | Status: SHIPPED | OUTPATIENT
Start: 2024-12-02 | End: 2024-12-05

## 2024-12-02 NOTE — TELEPHONE ENCOUNTER
Called TALIA MONTERROSO to follow up on pt's urine culture results. I had lab representative at [facility] lab fax us the correct lab results. I gave pt update via telephone. Results at desk for your review. Please advise.

## 2024-12-02 NOTE — TELEPHONE ENCOUNTER
Patient contacted and informed of the following: Urine culture with sensitivity results noted. I have put the following orders and/or medications to this note.        Patient was informed of her antibiotic that was sent to her pharmacy, she verbally understood the information given.

## 2024-12-02 NOTE — TELEPHONE ENCOUNTER
Urine culture with sensitivity results noted. I have put the following orders and/or medications to this note.  Please advise pt and assist.    No orders of the defined types were placed in this encounter.      Medications Ordered This Encounter   Medications    levoFLOXacin (LEVAQUIN) 500 MG tablet     Sig: Take 1 tablet (500 mg total) by mouth once daily. for 3 days     Dispense:  3 tablet     Refill:  0

## 2024-12-02 NOTE — TELEPHONE ENCOUNTER
Patient states she used to follow with Dr. Luu in Cardiology and that she has been taking Eliquis ever since bilateral PE occurred. Pt wants to know if you suggest she sees cardiology/pulmonology?

## 2024-12-03 NOTE — TELEPHONE ENCOUNTER
Pulmonary consult is recommended; however, does she desire to see specialist in person or would she be okay with E-consult (information with question regarding continue or discontinue Eliquis for PE sent to specialist)?

## 2025-01-07 NOTE — TELEPHONE ENCOUNTER
Refill Routing Note   Medication(s) are not appropriate for processing by [facility] for the following reason(s):        Outside of protocol    [facility] action(s):  Route               Appointments  past 12m or future 3m with PCP    Date Provider   Last Visit   8/27/2024 Krista Casas MD   Next Visit   2/27/2025 Krista Casas MD   ED visits in past 90 days: 0        Note composed:5:06 PM 01/07/2025

## 2025-01-08 RX ORDER — APIXABAN 5 MG/1
TABLET, FILM COATED ORAL
Qty: 60 TABLET | Refills: 0 | Status: SHIPPED | OUTPATIENT
Start: 2025-01-08

## 2025-02-05 ENCOUNTER — DOCUMENT SCAN (OUTPATIENT)
Dept: HOME HEALTH SERVICES | Facility: HOSPITAL | Age: 70
End: 2025-02-05
Payer: COMMERCIAL

## 2025-02-17 NOTE — TELEPHONE ENCOUNTER
----- Message from Gini sent at 2/17/2025  2:31 PM CST -----  Contact: pt  Type:  RX Refill RequestWho Called:  ptRefill or New Rx: refills (2)RX Name and Strength: klor-con 10 meq and metoprolol tartrate (LOPRESSOR) 25 MG tabletHow is the patient currently taking it? (ex. 1XDay):Is this a 30 day or 90 day RX:Preferred Pharmacy with phone number: [phone] or Mail Order: localOrdering Provider: cookWould the patient rather a call back or a response via [facility]? Best Call Back Number:Additional Information: [id number] [place] - [address] AT [facility] [place] [address]Fvvtw: [phone] Fax: [phone]

## 2025-02-17 NOTE — TELEPHONE ENCOUNTER
No care due was identified.  [facility] Embedded Care Due Messages. Reference number: [id number].   2/17/2025 2:22:45 PM CST

## 2025-02-17 NOTE — TELEPHONE ENCOUNTER
Unable to reach patient in regards to her refill request for Potassium, metoprolol and eliquis. Unable to leave a message for the patient. Refill request has been submitted to provider.

## 2025-02-18 RX ORDER — APIXABAN 5 MG/1
TABLET, FILM COATED ORAL
Qty: 60 TABLET | Refills: 0 | Status: SHIPPED | OUTPATIENT
Start: 2025-02-18

## 2025-02-18 RX ORDER — METOPROLOL TARTRATE 25 MG/1
25 TABLET, FILM COATED ORAL 2 TIMES DAILY
Qty: 180 TABLET | Refills: 1 | Status: SHIPPED | OUTPATIENT
Start: 2025-02-18

## 2025-02-18 NOTE — TELEPHONE ENCOUNTER
Refill Routing Note   Medication(s) are not appropriate for processing by [facility] for the following reason(s):        No active prescription written by provider  Outside of protocol  Due for refill >6 months ago    [facility] action(s):  Defer  Route               Appointments  past 12m or future 3m with PCP    Date Provider   Last Visit   8/27/2024 Krista Casas MD   Next Visit   2/27/2025 Krista Casas MD   ED visits in past 90 days: 0        Note composed:11:56 AM 02/18/2025

## 2025-02-20 ENCOUNTER — PATIENT MESSAGE (OUTPATIENT)
Dept: INTERNAL MEDICINE | Facility: CLINIC | Age: 70
End: 2025-02-20
Payer: COMMERCIAL

## 2025-02-25 DIAGNOSIS — N32.81 OAB (OVERACTIVE BLADDER): ICD-10-CM

## 2025-02-25 RX ORDER — SOLIFENACIN SUCCINATE 5 MG/1
5 TABLET, FILM COATED ORAL
Qty: 90 TABLET | Refills: 1 | Status: SHIPPED | OUTPATIENT
Start: 2025-02-25

## 2025-02-25 NOTE — TELEPHONE ENCOUNTER
No care due was identified.  [facility] Embedded Care Due Messages. Reference number: [id number].   2/25/2025 12:16:34 AM CST

## 2025-02-25 NOTE — TELEPHONE ENCOUNTER
Refill Decision Note   April Castillo  is requesting a refill authorization.  Brief Assessment and Rationale for Refill:  Approve     Medication Therapy Plan:         Comments:     Note composed:2:37 AM 02/25/2025

## 2025-04-01 ENCOUNTER — TELEPHONE (OUTPATIENT)
Dept: FAMILY MEDICINE | Facility: CLINIC | Age: 70
End: 2025-04-01
Payer: COMMERCIAL

## 2025-04-01 NOTE — TELEPHONE ENCOUNTER
Patient called in wanting to schedule to be seen to discuss weight loss injections. Patient stated she will check with her transportation to see when they will be able to bring her, then she will call back to schedule.

## 2025-05-05 ENCOUNTER — TELEPHONE (OUTPATIENT)
Dept: FAMILY MEDICINE | Facility: CLINIC | Age: 70
End: 2025-05-05
Payer: COMMERCIAL

## 2025-05-05 NOTE — TELEPHONE ENCOUNTER
Sure. I have put the following orders and/or medications to this note.  However, please advise pt I will not refill again as must see for physical/medication refill on/after 7/9/2025. Assist with scheduling if she desires. Thanks.    No orders of the defined types were placed in this encounter.      Medications Ordered This Encounter   Medications    apixaban (ELIQUIS) 5 mg Tab     Sig: TAKE 1 TABLET ORALLY 2 TIMES A DAY MUST SEE MD FOR FURTHER REFILLS     Dispense:  180 tablet     Refill:  0

## 2025-05-05 NOTE — TELEPHONE ENCOUNTER
Patient pharmacy sent over a request for a new prescription for her Eliquis 5 mg.     Lov: 8/27/2024

## 2025-05-05 NOTE — TELEPHONE ENCOUNTER
----- Message from Genoveva Armenta sent at 5/5/2025 12:23 PM CDT -----  Contact: April  .Type:  RX Refill RequestWho Called:  AprilRefill or New Rx: Refill RX Name and Strength:ELIQUIS 5 mg TabHow is the patient currently taking it? (ex. 1XDay): 2XDailyIs this a 30 day or 90 day RX: 90Preferred Pharmacy with phone number: .[id number] - [place] - [address] AT [place] [address] [address] [facility] [address]Bdlte: [phone] Fax: [phone] or Mail Order: LocalOrdering Provider: Krista Elizabeth the patient rather a call back or a response via [facility]?  [facility] Call Back Number: [phone] Information:  Patient requesting refill

## 2025-05-05 NOTE — TELEPHONE ENCOUNTER
Patient contacted and informed that Dr. Casas has sent her Eliquis refill to her pharmacy. She verbally understood the information given. She was also made aware that she will need to schedule her Physical and medication refill OV. Patient did scheduled and verbally understood the information given.

## 2025-05-05 NOTE — TELEPHONE ENCOUNTER
Pt called in to request Eliquis 5 mg refill. Pt is asking if prescription can be changed to a 90 day supply (pt takes Eliquis twice daily). Please advise.     LV: 8/27/24

## 2025-07-13 DIAGNOSIS — N32.81 OAB (OVERACTIVE BLADDER): ICD-10-CM

## 2025-07-13 NOTE — TELEPHONE ENCOUNTER
Care Due:                  Date            Visit Type   Department     Provider  --------------------------------------------------------------------------------                                EP -                              PRIMARY      [facility]  Last Visit: 08-      CARE ([facility])   MEDICINE       Krista Casas  Next Visit: None Scheduled  None         None Found                                                            Last  Test          Frequency    Reason                     Performed    Due Date  --------------------------------------------------------------------------------    CBC.........  12 months..  apixaban.................  Not Found    Overdue    Cr..........  12 months..  apixaban.................  07-   07-    [facility] Embedded Care Due Messages. Reference number: [id number].   7/13/2025 2:04:19 PM CDT

## 2025-07-14 RX ORDER — SOLIFENACIN SUCCINATE 5 MG/1
5 TABLET, FILM COATED ORAL
Qty: 90 TABLET | Refills: 0 | Status: SHIPPED | OUTPATIENT
Start: 2025-07-14

## 2025-07-15 NOTE — TELEPHONE ENCOUNTER
Provider Staff:  Action required for this patient     Please see care gap opportunities below in Care Due Message.    Thanks!  [facility]     Appointments      Date Provider   Last Visit   8/27/2024 Krista Casas MD   Next Visit   Visit date not found Krista Casas MD      Refill Decision Note   April Castillo  is requesting a refill authorization.  Brief Assessment and Rationale for Refill:  Approve     Medication Therapy Plan:         Comments:     Note composed:8:01 PM 07/14/2025

## 2025-07-16 DIAGNOSIS — I10 ESSENTIAL HYPERTENSION: ICD-10-CM

## 2025-08-19 RX ORDER — METOPROLOL TARTRATE 25 MG/1
25 TABLET, FILM COATED ORAL 2 TIMES DAILY
Qty: 180 TABLET | Refills: 0 | Status: SHIPPED | OUTPATIENT
Start: 2025-08-19

## 2025-08-27 DIAGNOSIS — Z78.0 MENOPAUSE: ICD-10-CM

## 2025-08-29 ENCOUNTER — PATIENT MESSAGE (OUTPATIENT)
Dept: ADMINISTRATIVE | Facility: HOSPITAL | Age: 70
End: 2025-08-29
Payer: COMMERCIAL